# Patient Record
Sex: FEMALE | Race: WHITE | NOT HISPANIC OR LATINO | Employment: FULL TIME | URBAN - METROPOLITAN AREA
[De-identification: names, ages, dates, MRNs, and addresses within clinical notes are randomized per-mention and may not be internally consistent; named-entity substitution may affect disease eponyms.]

---

## 2023-07-07 ENCOUNTER — OFFICE VISIT (OUTPATIENT)
Dept: PRIMARY CARE | Facility: CLINIC | Age: 38
End: 2023-07-07
Payer: COMMERCIAL

## 2023-07-07 DIAGNOSIS — F98.8 ATTENTION DEFICIT DISORDER (ADD) IN ADULT: Primary | ICD-10-CM

## 2023-07-07 PROCEDURE — 99213 OFFICE O/P EST LOW 20 MIN: CPT | Performed by: NURSE PRACTITIONER

## 2023-07-07 RX ORDER — DEXTROAMPHETAMINE SACCHARATE, AMPHETAMINE ASPARTATE, DEXTROAMPHETAMINE SULFATE AND AMPHETAMINE SULFATE 1.25; 1.25; 1.25; 1.25 MG/1; MG/1; MG/1; MG/1
5 TABLET ORAL DAILY
Qty: 30 TABLET | Refills: 0 | Status: SHIPPED | OUTPATIENT
Start: 2023-07-07 | End: 2023-07-26 | Stop reason: DRUGHIGH

## 2023-07-07 NOTE — PROGRESS NOTES
Therapist @ [facility]  Ivonne Vivas  (sp?)   ASRS V suggestive of ADD  Discussed overlaps between anxiety and ADD  Reports work focus an issue.  Reviewed findings.    No H/A, palpitations, respiratory or GI sx.  Constitutional WNL.    Alert, good insight.  HRRR  CTA  Neuro without deficit  Thyroid unremarkable  No edema   What Type Of Note Output Would You Prefer (Optional)?: Bullet Format How Severe Are Your Spot(S)?: moderate Hpi Title: Evaluation of Skin Lesions Additional History: Waist up exam

## 2023-07-26 ENCOUNTER — OFFICE VISIT (OUTPATIENT)
Dept: PRIMARY CARE | Facility: CLINIC | Age: 38
End: 2023-07-26
Payer: COMMERCIAL

## 2023-07-26 DIAGNOSIS — F98.8 ATTENTION DEFICIT DISORDER (ADD) IN ADULT: Primary | ICD-10-CM

## 2023-07-26 PROCEDURE — 99213 OFFICE O/P EST LOW 20 MIN: CPT | Performed by: NURSE PRACTITIONER

## 2023-07-26 RX ORDER — DEXTROAMPHETAMINE SACCHARATE, AMPHETAMINE ASPARTATE, DEXTROAMPHETAMINE SULFATE AND AMPHETAMINE SULFATE 2.5; 2.5; 2.5; 2.5 MG/1; MG/1; MG/1; MG/1
10 TABLET ORAL 2 TIMES DAILY
Qty: 60 TABLET | Refills: 0 | Status: SHIPPED | OUTPATIENT
Start: 2023-07-26 | End: 2023-08-30 | Stop reason: SDUPTHER

## 2023-07-26 NOTE — PROGRESS NOTES
Subjective   Patient ID: Rylee Bear is a 38 y.o. female who presents for  follow up    No effect from Adderall 5 mgm  No untoward effects either.  Open to titration as discussed initially.  Urine tox screen ordered today but unable to send to lab d/t technical issue with [employer]        Review of Systems    Objective   Physical Exam  Vitals (114/68  70) reviewed.   Constitutional:       Appearance: Normal appearance.   Cardiovascular:      Rate and Rhythm: Normal rate and regular rhythm.   Pulmonary:      Effort: Pulmonary effort is normal.      Breath sounds: Normal breath sounds.   Skin:     Capillary Refill: Capillary refill takes less than 2 seconds.   Neurological:      General: No focal deficit present.      Mental Status: She is alert.   Psychiatric:         Mood and Affect: Mood normal.         Assessment/Plan

## 2023-07-31 NOTE — PATIENT INSTRUCTIONS
Reviewed PDMP  Will start low dose Adderall, assess for response(s) and revisit 2 weeks.  Reviewed SCA,  protocol.  Reviewed symptoms to report in the interim.

## 2023-08-14 ENCOUNTER — APPOINTMENT (OUTPATIENT)
Dept: PRIMARY CARE | Facility: CLINIC | Age: 38
End: 2023-08-14
Payer: COMMERCIAL

## 2023-08-16 NOTE — PATIENT INSTRUCTIONS
Will increase to 10 mgm, reviewed correct use.  CSA UTD per protocol, efficacy pending.  Follow up 1 month and PRN

## 2023-08-30 ENCOUNTER — OFFICE VISIT (OUTPATIENT)
Dept: PRIMARY CARE | Facility: CLINIC | Age: 38
End: 2023-08-30
Payer: COMMERCIAL

## 2023-08-30 DIAGNOSIS — Z00.00 ANNUAL PHYSICAL EXAM: ICD-10-CM

## 2023-08-30 DIAGNOSIS — F98.8 ATTENTION DEFICIT DISORDER (ADD) IN ADULT: Primary | ICD-10-CM

## 2023-08-30 PROCEDURE — 80324 DRUG SCREEN AMPHETAMINES 1/2: CPT

## 2023-08-30 PROCEDURE — 99396 PREV VISIT EST AGE 40-64: CPT | Performed by: NURSE PRACTITIONER

## 2023-08-30 PROCEDURE — 99213 OFFICE O/P EST LOW 20 MIN: CPT | Performed by: NURSE PRACTITIONER

## 2023-08-30 PROCEDURE — 80307 DRUG TEST PRSMV CHEM ANLYZR: CPT

## 2023-08-30 RX ORDER — DEXTROAMPHETAMINE SACCHARATE, AMPHETAMINE ASPARTATE, DEXTROAMPHETAMINE SULFATE AND AMPHETAMINE SULFATE 2.5; 2.5; 2.5; 2.5 MG/1; MG/1; MG/1; MG/1
10 TABLET ORAL DAILY
Qty: 30 TABLET | Refills: 0 | Status: SHIPPED | OUTPATIENT
Start: 2023-08-30 | End: 2023-10-11

## 2023-08-30 RX ORDER — DEXTROAMPHETAMINE SACCHARATE, AMPHETAMINE ASPARTATE, DEXTROAMPHETAMINE SULFATE AND AMPHETAMINE SULFATE 5; 5; 5; 5 MG/1; MG/1; MG/1; MG/1
20 TABLET ORAL DAILY
Qty: 30 TABLET | Refills: 0 | Status: SHIPPED | OUTPATIENT
Start: 2023-08-30 | End: 2023-10-11

## 2023-08-30 NOTE — PROGRESS NOTES
Subjective   Patient ID: Rylee Bear is a 38 y.o. female who presents for annual exam  Dentist  6 months  Eye  + Salzmans  Degeneration Nodules - january   Mammo gram    M - HTN cholesterol    Brothers not known  GP  fathers  COPD   ETOH  MGP  90s  Mac degen   CHOL CABG  F  85 s/p CABG and pacer   age 70s  Mom pre diabetic  weight up and down      Feels well overall  Knees chiro        Review of Systems   Constitutional: Negative.    HENT: Negative.     Respiratory: Negative.     Endocrine: Negative.    Genitourinary: Negative.        Objective   Physical Exam  Vitals reviewed.   Constitutional:       Appearance: Normal appearance.   Eyes:      Extraocular Movements: Extraocular movements intact.   Cardiovascular:      Rate and Rhythm: Normal rate and regular rhythm.   Pulmonary:      Effort: Pulmonary effort is normal.      Breath sounds: Normal breath sounds.   Abdominal:      Palpations: There is no mass.      Tenderness: There is no abdominal tenderness.   Musculoskeletal:      Cervical back: Normal range of motion.      Right lower leg: No edema.      Left lower leg: No edema.   Neurological:      General: No focal deficit present.      Mental Status: She is alert.   Psychiatric:         Mood and Affect: Mood normal.         Assessment/Plan   Diagnoses and all orders for this visit:  Attention deficit disorder (ADD) in adult  -     Drug Screen, Urine With Reflex to Confirmation  -     amphetamine-dextroamphetamine (Adderall) 10 mg tablet; Take 1 tablet (10 mg) by mouth once daily.  -     amphetamine-dextroamphetamine (Adderall) 20 mg tablet; Take 1 tablet (20 mg) by mouth once daily.  -     Amphetamine Confirm, Urine  Annual physical exam  -     Lipid Panel; Future  -     Comprehensive Metabolic Panel; Future  -     CBC and Auto Differential; Future

## 2023-08-31 LAB
AMPHETAMINE (PRESENCE) IN URINE BY SCREEN METHOD: ABNORMAL
BARBITURATES PRESENCE IN URINE BY SCREEN METHOD: ABNORMAL
BENZODIAZEPINE (PRESENCE) IN URINE BY SCREEN METHOD: ABNORMAL
CANNABINOIDS IN URINE BY SCREEN METHOD: ABNORMAL
COCAINE (PRESENCE) IN URINE BY SCREEN METHOD: ABNORMAL
DRUG SCREEN COMMENT URINE: ABNORMAL
FENTANYL URINE: ABNORMAL
OPIATES (PRESENCE) IN URINE BY SCREEN METHOD: ABNORMAL
OXYCODONE (PRESENCE) IN URINE BY SCREEN METHOD: ABNORMAL
PHENCYCLIDINE (PRESENCE) IN URINE BY SCREEN METHOD: ABNORMAL

## 2023-09-04 LAB
AMPHETAMINES,URINE: >5000 NG/ML
MDA,URINE: <200 NG/ML
MDEA,URINE: <200 NG/ML
MDMA,UR: <200 NG/ML
METHAMPHETAMINE QUANTITATIVE URINE: <200 NG/ML
PHENTERMINE,UR: <200 NG/ML

## 2023-09-15 ASSESSMENT — ENCOUNTER SYMPTOMS
ENDOCRINE NEGATIVE: 1
CONSTITUTIONAL NEGATIVE: 1
RESPIRATORY NEGATIVE: 1

## 2023-09-29 PROBLEM — J06.9 VIRAL UPPER RESPIRATORY TRACT INFECTION: Status: ACTIVE | Noted: 2023-09-29

## 2023-10-11 DIAGNOSIS — F98.8 ATTENTION DEFICIT DISORDER (ADD) IN ADULT: ICD-10-CM

## 2023-10-11 RX ORDER — DEXTROAMPHETAMINE SACCHARATE, AMPHETAMINE ASPARTATE, DEXTROAMPHETAMINE SULFATE AND AMPHETAMINE SULFATE 5; 5; 5; 5 MG/1; MG/1; MG/1; MG/1
40 TABLET ORAL DAILY
Qty: 90 TABLET | Refills: 0 | Status: SHIPPED | OUTPATIENT
Start: 2023-10-11 | End: 2023-11-27 | Stop reason: SDUPTHER

## 2023-10-20 ENCOUNTER — LAB (OUTPATIENT)
Dept: LAB | Facility: LAB | Age: 38
End: 2023-10-20
Payer: COMMERCIAL

## 2023-10-20 DIAGNOSIS — Z00.00 ANNUAL PHYSICAL EXAM: ICD-10-CM

## 2023-10-20 LAB
ALBUMIN SERPL BCP-MCNC: 3.9 G/DL (ref 3.4–5)
ALP SERPL-CCNC: 44 U/L (ref 33–110)
ALT SERPL W P-5'-P-CCNC: 16 U/L (ref 7–45)
ANION GAP SERPL CALC-SCNC: 11 MMOL/L (ref 10–20)
AST SERPL W P-5'-P-CCNC: 21 U/L (ref 9–39)
BASOPHILS # BLD AUTO: 0.1 X10*3/UL (ref 0–0.1)
BASOPHILS NFR BLD AUTO: 1.7 %
BILIRUB SERPL-MCNC: 0.6 MG/DL (ref 0–1.2)
BUN SERPL-MCNC: 19 MG/DL (ref 6–23)
CALCIUM SERPL-MCNC: 9.3 MG/DL (ref 8.6–10.3)
CHLORIDE SERPL-SCNC: 104 MMOL/L (ref 98–107)
CHOLEST SERPL-MCNC: 221 MG/DL (ref 0–199)
CHOLESTEROL/HDL RATIO: 2.5
CO2 SERPL-SCNC: 26 MMOL/L (ref 21–32)
CREAT SERPL-MCNC: 1.04 MG/DL (ref 0.5–1.05)
EOSINOPHIL # BLD AUTO: 0.41 X10*3/UL (ref 0–0.7)
EOSINOPHIL NFR BLD AUTO: 7 %
ERYTHROCYTE [DISTWIDTH] IN BLOOD BY AUTOMATED COUNT: 11.8 % (ref 11.5–14.5)
GFR SERPL CREATININE-BSD FRML MDRD: 70 ML/MIN/1.73M*2
GLUCOSE SERPL-MCNC: 90 MG/DL (ref 74–99)
HCT VFR BLD AUTO: 41.2 % (ref 36–46)
HDLC SERPL-MCNC: 88.1 MG/DL
HGB BLD-MCNC: 13.7 G/DL (ref 12–16)
IMM GRANULOCYTES # BLD AUTO: 0.01 X10*3/UL (ref 0–0.7)
IMM GRANULOCYTES NFR BLD AUTO: 0.2 % (ref 0–0.9)
LDLC SERPL CALC-MCNC: 116 MG/DL
LYMPHOCYTES # BLD AUTO: 2.41 X10*3/UL (ref 1.2–4.8)
LYMPHOCYTES NFR BLD AUTO: 41.1 %
MCH RBC QN AUTO: 29.8 PG (ref 26–34)
MCHC RBC AUTO-ENTMCNC: 33.3 G/DL (ref 32–36)
MCV RBC AUTO: 90 FL (ref 80–100)
MONOCYTES # BLD AUTO: 0.46 X10*3/UL (ref 0.1–1)
MONOCYTES NFR BLD AUTO: 7.8 %
NEUTROPHILS # BLD AUTO: 2.48 X10*3/UL (ref 1.2–7.7)
NEUTROPHILS NFR BLD AUTO: 42.2 %
NON HDL CHOLESTEROL: 133 MG/DL (ref 0–149)
NRBC BLD-RTO: 0 /100 WBCS (ref 0–0)
PLATELET # BLD AUTO: 321 X10*3/UL (ref 150–450)
PMV BLD AUTO: 10.2 FL (ref 7.5–11.5)
POTASSIUM SERPL-SCNC: 3.7 MMOL/L (ref 3.5–5.3)
PROT SERPL-MCNC: 7.3 G/DL (ref 6.4–8.2)
RBC # BLD AUTO: 4.6 X10*6/UL (ref 4–5.2)
SODIUM SERPL-SCNC: 137 MMOL/L (ref 136–145)
TRIGL SERPL-MCNC: 87 MG/DL (ref 0–149)
VLDL: 17 MG/DL (ref 0–40)
WBC # BLD AUTO: 5.9 X10*3/UL (ref 4.4–11.3)

## 2023-10-20 PROCEDURE — 80053 COMPREHEN METABOLIC PANEL: CPT

## 2023-10-20 PROCEDURE — 85025 COMPLETE CBC W/AUTO DIFF WBC: CPT

## 2023-10-20 PROCEDURE — 80061 LIPID PANEL: CPT

## 2023-10-20 PROCEDURE — 36415 COLL VENOUS BLD VENIPUNCTURE: CPT

## 2023-11-20 ENCOUNTER — PATIENT MESSAGE (OUTPATIENT)
Dept: PRIMARY CARE | Facility: CLINIC | Age: 38
End: 2023-11-20
Payer: COMMERCIAL

## 2023-11-20 DIAGNOSIS — F98.8 ATTENTION DEFICIT DISORDER (ADD) IN ADULT: ICD-10-CM

## 2023-11-27 RX ORDER — DEXTROAMPHETAMINE SACCHARATE, AMPHETAMINE ASPARTATE, DEXTROAMPHETAMINE SULFATE AND AMPHETAMINE SULFATE 5; 5; 5; 5 MG/1; MG/1; MG/1; MG/1
40 TABLET ORAL DAILY
Qty: 90 TABLET | Refills: 0 | Status: SHIPPED | OUTPATIENT
Start: 2023-11-27 | End: 2024-01-03 | Stop reason: SDUPTHER

## 2023-11-29 DIAGNOSIS — J30.9 ALLERGIC RHINITIS, UNSPECIFIED SEASONALITY, UNSPECIFIED TRIGGER: Primary | ICD-10-CM

## 2023-11-29 RX ORDER — LEVOCETIRIZINE DIHYDROCHLORIDE 5 MG/1
5 TABLET, FILM COATED ORAL DAILY
Qty: 30 TABLET | Refills: 0 | Status: SHIPPED | OUTPATIENT
Start: 2023-11-29 | End: 2023-12-28 | Stop reason: SDUPTHER

## 2023-12-28 DIAGNOSIS — J30.9 ALLERGIC RHINITIS, UNSPECIFIED SEASONALITY, UNSPECIFIED TRIGGER: ICD-10-CM

## 2023-12-28 RX ORDER — LEVOCETIRIZINE DIHYDROCHLORIDE 5 MG/1
5 TABLET, FILM COATED ORAL DAILY
Qty: 30 TABLET | Refills: 0 | Status: SHIPPED | OUTPATIENT
Start: 2023-12-28 | End: 2024-01-23 | Stop reason: SDUPTHER

## 2024-01-03 ENCOUNTER — TELEMEDICINE (OUTPATIENT)
Dept: PRIMARY CARE | Facility: CLINIC | Age: 39
End: 2024-01-03
Payer: COMMERCIAL

## 2024-01-03 DIAGNOSIS — F98.8 ATTENTION DEFICIT DISORDER (ADD) IN ADULT: ICD-10-CM

## 2024-01-03 PROCEDURE — 99213 OFFICE O/P EST LOW 20 MIN: CPT | Performed by: NURSE PRACTITIONER

## 2024-01-03 RX ORDER — DEXTROAMPHETAMINE SACCHARATE, AMPHETAMINE ASPARTATE, DEXTROAMPHETAMINE SULFATE AND AMPHETAMINE SULFATE 5; 5; 5; 5 MG/1; MG/1; MG/1; MG/1
40 TABLET ORAL DAILY
Qty: 90 TABLET | Refills: 0 | Status: SHIPPED | OUTPATIENT
Start: 2024-01-03 | End: 2024-02-02 | Stop reason: SDUPTHER

## 2024-01-15 DIAGNOSIS — Z30.019 ENCOUNTER FOR FEMALE BIRTH CONTROL: ICD-10-CM

## 2024-01-15 RX ORDER — DROSPIRENONE AND ETHINYL ESTRADIOL 0.03MG-3MG
1 KIT ORAL
Qty: 28 TABLET | Refills: 12 | Status: SHIPPED | OUTPATIENT
Start: 2024-01-15 | End: 2024-01-24 | Stop reason: SDUPTHER

## 2024-01-15 RX ORDER — DROSPIRENONE AND ETHINYL ESTRADIOL 0.03MG-3MG
1 KIT ORAL
COMMUNITY
Start: 2016-11-22 | End: 2024-01-15 | Stop reason: SDUPTHER

## 2024-01-19 ASSESSMENT — ENCOUNTER SYMPTOMS
NERVOUS/ANXIOUS: 0
GASTROINTESTINAL NEGATIVE: 1
SLEEP DISTURBANCE: 0
TREMORS: 0
LIGHT-HEADEDNESS: 0
ENDOCRINE NEGATIVE: 1
CONSTITUTIONAL NEGATIVE: 1
HYPERACTIVE: 0
HEADACHES: 0
DIZZINESS: 0
WEAKNESS: 0

## 2024-01-19 NOTE — PROGRESS NOTES
Subjective   Patient ID: Rylee Bear is a 38 y.o. female who presents for Med Refill.  Med followup, report no untoward effects of med use.  Wishes to continue with same dose    OARRS:  No data recorded  I believe that it is clinically appropriate for Rylee Bear to be prescribed this medication    Is the patient prescribed a combination of a benzodiazepine and opioid?  Yes, I feel it is clincially indicated to continue the medication and have discussed with the patient risks/benefits/alternatives.    Last Urine Drug Screen / ordered today: No  Recent Results (from the past 8760 hour(s))   Amphetamine Confirm, Urine    Collection Time: 08/30/23  2:29 PM   Result Value Ref Range    Methamphetamine Quant, Ur <200 ng/mL    MDA, Urine <200 ng/mL    MDEA, Urine <200 ng/mL    Phentermine,Urine <200 ng/mL    Amphetamines,Urine >5000 ng/mL    MDMA, Urine <200 ng/mL   Drug Screen, Urine With Reflex to Confirmation    Collection Time: 08/30/23  2:29 PM   Result Value Ref Range    DRUG SCREEN COMMENT URINE SEE BELOW     Amphetamine Screen, Urine PRESUMPTIVE POSITIVE (A) NEGATIVE    Barbiturate Screen, Urine PRESUMPTIVE NEGATIVE NEGATIVE    BENZODIAZEPINE (PRESENCE) IN URINE BY SCREEN METHOD PRESUMPTIVE NEGATIVE NEGATIVE    Cannabinoid Screen, Urine PRESUMPTIVE NEGATIVE NEGATIVE    Cocaine Screen, Urine PRESUMPTIVE NEGATIVE NEGATIVE    Fentanyl, Ur PRESUMPTIVE NEGATIVE NEGATIVE    Opiate Screen, Urine PRESUMPTIVE NEGATIVE NEGATIVE    Oxycodone Screen, Ur PRESUMPTIVE NEGATIVE NEGATIVE    PCP Screen, Urine PRESUMPTIVE NEGATIVE NEGATIVE     Results are as expected.         Controlled Substance Agreement:  Date of the Last Agreement: 08/23  Reviewed Controlled Substance Agreement including but not limited to the benefits, risks, and alternatives to treatment with a Controlled Substance medication(s).    Stimulants:   What is the patient's goal of therapy? Work focus  Is this being achieved with current treatment?  yes    Activities of Daily Living:   Is your overall impression that this patient is benefiting (symptom reduction outweighs side effects) from stimulant therapy? Yes     1. Physical Functioning: Same  2. Family Relationship: Same  3. Social Relationship: Same  4. Mood: Same  5. Sleep Patterns: Same  6. Overall Function: Better      Review of Systems   Constitutional: Negative.    Gastrointestinal: Negative.    Endocrine: Negative.    Neurological:  Negative for dizziness, tremors, weakness, light-headedness and headaches.   Psychiatric/Behavioral:  Negative for sleep disturbance. The patient is not nervous/anxious and is not hyperactive.        Objective   Physical Exam  Constitutional:       Appearance: Normal appearance.   Pulmonary:      Effort: Pulmonary effort is normal.   Musculoskeletal:      Cervical back: Neck supple.   Neurological:      General: No focal deficit present.      Mental Status: She is oriented to person, place, and time.         Assessment/Plan   Diagnoses and all orders for this visit:  Attention deficit disorder (ADD) in adult  -     amphetamine-dextroamphetamine (Adderall) 20 mg tablet; Take 2 tablets (40 mg) by mouth once daily. MR one tablet @ 1 PM if needed           TIRSO Rader-CNP 01/19/24 9:54 AM

## 2024-01-23 ENCOUNTER — OFFICE VISIT (OUTPATIENT)
Dept: ALLERGY | Facility: CLINIC | Age: 39
End: 2024-01-23
Payer: COMMERCIAL

## 2024-01-23 VITALS
HEART RATE: 64 BPM | HEIGHT: 69 IN | TEMPERATURE: 97.7 F | BODY MASS INDEX: 26.96 KG/M2 | RESPIRATION RATE: 17 BRPM | WEIGHT: 182 LBS | SYSTOLIC BLOOD PRESSURE: 120 MMHG | DIASTOLIC BLOOD PRESSURE: 74 MMHG | OXYGEN SATURATION: 97 %

## 2024-01-23 DIAGNOSIS — J30.9 ALLERGIC RHINITIS, UNSPECIFIED SEASONALITY, UNSPECIFIED TRIGGER: ICD-10-CM

## 2024-01-23 PROCEDURE — 99213 OFFICE O/P EST LOW 20 MIN: CPT | Performed by: ALLERGY & IMMUNOLOGY

## 2024-01-23 RX ORDER — LEVOCETIRIZINE DIHYDROCHLORIDE 5 MG/1
5 TABLET, FILM COATED ORAL DAILY
Qty: 90 TABLET | Refills: 3 | Status: SHIPPED | OUTPATIENT
Start: 2024-01-23 | End: 2025-01-22

## 2024-01-23 ASSESSMENT — ENCOUNTER SYMPTOMS
PSYCHIATRIC NEGATIVE: 1
GASTROINTESTINAL NEGATIVE: 1
RESPIRATORY NEGATIVE: 1
MUSCULOSKELETAL NEGATIVE: 1
EYES NEGATIVE: 1
NEUROLOGICAL NEGATIVE: 1
ENDOCRINE NEGATIVE: 1
CARDIOVASCULAR NEGATIVE: 1
CONSTITUTIONAL NEGATIVE: 1
HEMATOLOGIC/LYMPHATIC NEGATIVE: 1

## 2024-01-23 NOTE — PROGRESS NOTES
"Patient being seen for annual visit   Has no concerns  Last PAP: 6/18/18 NML HPV -                  5/9/14 NML   Last MAMM: 5/18/23 NML     Fabienne Jeronimo MA    Karlie Bear is a 38 y.o. female who is here for a routine exam. No vaginal concerns at this time including abnormal discharge, odor or discomfort. She is not currently sexually active and  has no concern for STI exposure. She has no pain or bleeding with sex. She denies bladder or bowel concerns.      Current contraception: OCP (estrogen/progesterone)  LMP: 1/17/24  Last pap: 2018  History of abnormal Pap smear: no  Due for pap: yes -    Family history of uterine or ovarian cancer: no  Family history of prostate cancer: no  Family history of pancreatic cancer: no  Family hx of BRCA1/BRCA2: no  Family history of breast cancer: no  Last mammogram: 5/2023, nml  Gardasil:       OB History    No obstetric history on file.         Review of Systems   All other systems reviewed and are negative.      Objective   /84 (BP Location: Right arm, Patient Position: Sitting, BP Cuff Size: Adult)   Ht 1.753 m (5' 9\")   Wt 82.6 kg (182 lb)   LMP 01/16/2024   BMI 26.88 kg/m²     Physical Exam  HENT:      Mouth/Throat:      Mouth: Mucous membranes are moist.   Eyes:      Conjunctiva/sclera: Conjunctivae normal.   Neck:      Thyroid: No thyroid mass, thyromegaly or thyroid tenderness.   Cardiovascular:      Rate and Rhythm: Normal rate and regular rhythm.      Pulses: Normal pulses.   Pulmonary:      Effort: Pulmonary effort is normal.      Breath sounds: Normal breath sounds.   Chest:   Breasts:     Breasts are symmetrical.      Right: Normal.      Left: Normal.   Abdominal:      General: Abdomen is flat.      Palpations: Abdomen is soft.      Hernia: There is no hernia in the left inguinal area or right inguinal area.   Genitourinary:     General: Normal vulva.      Vagina: Normal.      Cervix: Normal.      Uterus: Normal.       Adnexa: Right " adnexa normal and left adnexa normal.      Comments: Pap collected today  Musculoskeletal:         General: Normal range of motion.      Cervical back: Normal range of motion.   Lymphadenopathy:      Cervical: No cervical adenopathy.      Right cervical: No superficial, deep or posterior cervical adenopathy.     Left cervical: No superficial, deep or posterior cervical adenopathy.      Lower Body: No right inguinal adenopathy. No left inguinal adenopathy.   Skin:     General: Skin is warm.      Capillary Refill: Capillary refill takes less than 2 seconds.   Neurological:      Mental Status: She is alert and oriented to person, place, and time.   Psychiatric:         Mood and Affect: Mood normal.         Behavior: Behavior normal.          Assessment/Plan   A&P --> 38 y.o. y.o woman for annual GYN exam.     - Health Maintenance -->  - Routine follow up with PCP for health maintenance examination encouraged, including TSH, cholesterol, and Vit. D evaluation.  Self breast awareness encouraged; concerning characteristics of breasts reviewed - Pt. will report general concerns, any adherent lumps, skin dimpling/puckering or color changes, and any nipple discharge.    Diet and exercise reviewed.     Pap: will pap today and if wnl next pap in 5 years:- Reviewed ACOG and ASCCP guidelines with pt.        - STD Screening: declines     - Contraception Plan: OCP (estrogen/progesterone). No contraindications to continued use and refills sent to pharmacy     - F/U 1 year or as needed.      KIRSTIE Gaxiola

## 2024-01-23 NOTE — PROGRESS NOTES
"Subjective   Patient ID: Rylee Bear is a 38 y.o. female.    Chief Complaint: follow up  37 yo with a history of cat allergy and food intolerance to dairy.  Has two older cats (16 and 18 yo)  Notices congestion with dairy and beer.  No other new issues.        Environmental History: Pets in the home: cats (2).    Review of Systems   Constitutional: Negative.    HENT: Negative.     Eyes: Negative.    Respiratory: Negative.     Cardiovascular: Negative.    Gastrointestinal: Negative.    Endocrine: Negative.    Genitourinary: Negative.    Musculoskeletal: Negative.    Neurological: Negative.    Hematological: Negative.    Psychiatric/Behavioral: Negative.     /74   Pulse 64   Temp 36.5 °C (97.7 °F) (Oral)   Resp 17   Ht 1.753 m (5' 9\")   Wt 82.6 kg (182 lb)   SpO2 97%   BMI 26.88 kg/m²     Objective   Physical Exam  Vitals and nursing note reviewed.   Constitutional:       Appearance: Normal appearance.   HENT:      Right Ear: Tympanic membrane normal.      Left Ear: Tympanic membrane normal.      Nose: Nose normal.      Mouth/Throat:      Mouth: Mucous membranes are moist.   Eyes:      Conjunctiva/sclera: Conjunctivae normal.      Pupils: Pupils are equal, round, and reactive to light.   Cardiovascular:      Rate and Rhythm: Normal rate and regular rhythm.      Heart sounds: Normal heart sounds.   Pulmonary:      Effort: Pulmonary effort is normal.      Breath sounds: Normal breath sounds.   Abdominal:      General: Bowel sounds are normal.      Palpations: Abdomen is soft.   Musculoskeletal:         General: Normal range of motion.   Skin:     General: Skin is warm and dry.      Capillary Refill: Capillary refill takes less than 2 seconds.   Neurological:      General: No focal deficit present.      Mental Status: She is alert and oriented to person, place, and time.   Psychiatric:         Mood and Affect: Mood normal.     Laboratory:   N/a    Assessment/Plan    Well controlled allergic rhinitis on " Xyzal only. Has two elderly cats.    Continue current medication and avoidance measures  Follow up yearly or as needed.

## 2024-01-24 ENCOUNTER — OFFICE VISIT (OUTPATIENT)
Dept: OBSTETRICS AND GYNECOLOGY | Facility: CLINIC | Age: 39
End: 2024-01-24
Payer: COMMERCIAL

## 2024-01-24 VITALS
BODY MASS INDEX: 26.96 KG/M2 | DIASTOLIC BLOOD PRESSURE: 84 MMHG | HEIGHT: 69 IN | SYSTOLIC BLOOD PRESSURE: 128 MMHG | WEIGHT: 182 LBS

## 2024-01-24 DIAGNOSIS — Z30.019 ENCOUNTER FOR FEMALE BIRTH CONTROL: ICD-10-CM

## 2024-01-24 DIAGNOSIS — Z12.31 SCREENING MAMMOGRAM FOR BREAST CANCER: ICD-10-CM

## 2024-01-24 DIAGNOSIS — Z01.419 WELL WOMAN EXAM WITH ROUTINE GYNECOLOGICAL EXAM: Primary | ICD-10-CM

## 2024-01-24 PROCEDURE — 88175 CYTOPATH C/V AUTO FLUID REDO: CPT

## 2024-01-24 PROCEDURE — 87624 HPV HI-RISK TYP POOLED RSLT: CPT

## 2024-01-24 PROCEDURE — 1036F TOBACCO NON-USER: CPT

## 2024-01-24 PROCEDURE — 99396 PREV VISIT EST AGE 40-64: CPT

## 2024-01-24 RX ORDER — DROSPIRENONE AND ETHINYL ESTRADIOL 0.03MG-3MG
1 KIT ORAL
Qty: 84 TABLET | Refills: 3 | Status: SHIPPED | OUTPATIENT
Start: 2024-01-24 | End: 2025-01-23

## 2024-02-02 ENCOUNTER — TELEPHONE (OUTPATIENT)
Dept: PRIMARY CARE | Facility: CLINIC | Age: 39
End: 2024-02-02
Payer: COMMERCIAL

## 2024-02-02 DIAGNOSIS — F98.8 ATTENTION DEFICIT DISORDER (ADD) IN ADULT: ICD-10-CM

## 2024-02-05 LAB
CYTOLOGY CMNT CVX/VAG CYTO-IMP: NORMAL
HPV HR 12 DNA GENITAL QL NAA+PROBE: NEGATIVE
HPV HR GENOTYPES PNL CVX NAA+PROBE: NEGATIVE
HPV16 DNA SPEC QL NAA+PROBE: NEGATIVE
HPV18 DNA SPEC QL NAA+PROBE: NEGATIVE
LAB AP HPV GENOTYPE QUESTION: YES
LAB AP HPV HR: NORMAL
LABORATORY COMMENT REPORT: NORMAL
PATH REPORT.TOTAL CANCER: NORMAL

## 2024-02-05 RX ORDER — DEXTROAMPHETAMINE SACCHARATE, AMPHETAMINE ASPARTATE, DEXTROAMPHETAMINE SULFATE AND AMPHETAMINE SULFATE 5; 5; 5; 5 MG/1; MG/1; MG/1; MG/1
40 TABLET ORAL DAILY
Qty: 90 TABLET | Refills: 0 | Status: SHIPPED | OUTPATIENT
Start: 2024-02-05 | End: 2024-05-03 | Stop reason: SDUPTHER

## 2024-04-29 ENCOUNTER — TELEPHONE (OUTPATIENT)
Dept: PRIMARY CARE | Facility: CLINIC | Age: 39
End: 2024-04-29
Payer: COMMERCIAL

## 2024-04-29 DIAGNOSIS — F98.8 ATTENTION DEFICIT DISORDER (ADD) IN ADULT: ICD-10-CM

## 2024-04-29 NOTE — TELEPHONE ENCOUNTER
Rx Refill Request Telephone Encounter    Name:  Rylee Bear  :  [id number]  Medication Name:    amphetamine-dextroamphetamine (Adderall) 20 mg tablet                Specific Pharmacy location:    [place] [address] [place] [facility]  [facility]   [place] [id number]  Phone: [phone]  Fax: [phone]     Date of last appointment:  2024  Date of next appointment:  2024  Best number to reach patient:  [phone]

## 2024-05-01 RX ORDER — DEXTROAMPHETAMINE SACCHARATE, AMPHETAMINE ASPARTATE, DEXTROAMPHETAMINE SULFATE AND AMPHETAMINE SULFATE 5; 5; 5; 5 MG/1; MG/1; MG/1; MG/1
40 TABLET ORAL DAILY
Qty: 90 TABLET | Refills: 0 | OUTPATIENT
Start: 2024-05-01 | End: 2024-05-31

## 2024-05-03 ENCOUNTER — OFFICE VISIT (OUTPATIENT)
Dept: PRIMARY CARE | Facility: CLINIC | Age: 39
End: 2024-05-03
Payer: COMMERCIAL

## 2024-05-03 VITALS
BODY MASS INDEX: 27.55 KG/M2 | RESPIRATION RATE: 16 BRPM | TEMPERATURE: 97.6 F | WEIGHT: 186 LBS | DIASTOLIC BLOOD PRESSURE: 90 MMHG | HEIGHT: 69 IN | OXYGEN SATURATION: 99 % | HEART RATE: 69 BPM | SYSTOLIC BLOOD PRESSURE: 138 MMHG

## 2024-05-03 DIAGNOSIS — F98.8 ATTENTION DEFICIT DISORDER (ADD) IN ADULT: ICD-10-CM

## 2024-05-03 PROCEDURE — 99213 OFFICE O/P EST LOW 20 MIN: CPT | Performed by: NURSE PRACTITIONER

## 2024-05-03 RX ORDER — DEXTROAMPHETAMINE SACCHARATE, AMPHETAMINE ASPARTATE, DEXTROAMPHETAMINE SULFATE AND AMPHETAMINE SULFATE 5; 5; 5; 5 MG/1; MG/1; MG/1; MG/1
40 TABLET ORAL DAILY
Qty: 90 TABLET | Refills: 0 | Status: SHIPPED | OUTPATIENT
Start: 2024-05-03 | End: 2024-06-02

## 2024-05-03 NOTE — PROGRESS NOTES
"Karlie Bear is a 39 y.o. female who presents for Follow-up and ADHD.  Patient is here to Follow up on ADD.  Doing well on current regimen  reports no untoward effects.  CSA and urine drug screen up to date 8/3/23  No new concerns    ADHD  This is a chronic problem.     Review of Systems   Constitutional: Negative.  Negative for unexpected weight change.   Cardiovascular:  Negative for palpitations and leg swelling.   Gastrointestinal: Negative.    Neurological: Negative.    All other systems reviewed and are negative.      Objective   Physical Exam  Vitals and nursing note reviewed.   Constitutional:       Appearance: Normal appearance.   Cardiovascular:      Rate and Rhythm: Normal rate and regular rhythm.      Pulses: Normal pulses.   Pulmonary:      Effort: Pulmonary effort is normal.      Breath sounds: Normal breath sounds.   Musculoskeletal:      Cervical back: Normal range of motion.   Neurological:      General: No focal deficit present.      Mental Status: She is oriented to person, place, and time.   Psychiatric:         Mood and Affect: Mood normal.       /90 (BP Location: Right arm, Patient Position: Sitting)   Pulse 69   Temp 36.4 °C (97.6 °F)   Resp 16   Ht 1.753 m (5' 9\")   Wt 84.4 kg (186 lb)   SpO2 99%   BMI 27.47 kg/m²       Assessment/Plan   Problem List Items Addressed This Visit       Attention deficit disorder (ADD) in adult    Relevant Medications    amphetamine-dextroamphetamine (Adderall) 20 mg tablet       "

## 2024-05-31 ASSESSMENT — ENCOUNTER SYMPTOMS
NEUROLOGICAL NEGATIVE: 1
PALPITATIONS: 0
GASTROINTESTINAL NEGATIVE: 1
CONSTITUTIONAL NEGATIVE: 1
UNEXPECTED WEIGHT CHANGE: 0

## 2024-06-17 DIAGNOSIS — F98.8 ATTENTION DEFICIT DISORDER (ADD) IN ADULT: ICD-10-CM

## 2024-06-17 RX ORDER — DEXTROAMPHETAMINE SACCHARATE, AMPHETAMINE ASPARTATE, DEXTROAMPHETAMINE SULFATE AND AMPHETAMINE SULFATE 5; 5; 5; 5 MG/1; MG/1; MG/1; MG/1
40 TABLET ORAL DAILY
Qty: 90 TABLET | Refills: 0 | Status: SHIPPED | OUTPATIENT
Start: 2024-06-17 | End: 2024-07-17
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
## 2024-06-21 ENCOUNTER — OFFICE VISIT (OUTPATIENT)
Dept: ORTHOPEDIC SURGERY | Facility: CLINIC | Age: 39
End: 2024-06-21
Payer: COMMERCIAL

## 2024-06-21 VITALS — HEIGHT: 69 IN | WEIGHT: 186 LBS | BODY MASS INDEX: 27.55 KG/M2

## 2024-06-21 DIAGNOSIS — S83.511A RUPTURE OF ANTERIOR CRUCIATE LIGAMENT OF RIGHT KNEE, INITIAL ENCOUNTER: Primary | ICD-10-CM

## 2024-06-21 DIAGNOSIS — M25.561 RIGHT KNEE PAIN, UNSPECIFIED CHRONICITY: ICD-10-CM

## 2024-06-21 PROCEDURE — 99213 OFFICE O/P EST LOW 20 MIN: CPT | Performed by: ORTHOPAEDIC SURGERY

## 2024-06-21 PROCEDURE — 99204 OFFICE O/P NEW MOD 45 MIN: CPT | Performed by: ORTHOPAEDIC SURGERY

## 2024-06-21 RX ORDER — IBUPROFEN 800 MG/1
800 TABLET ORAL EVERY 8 HOURS PRN
Qty: 90 TABLET | Refills: 0 | Status: CANCELLED | OUTPATIENT
Start: 2024-06-21 | End: 2024-07-21

## 2024-06-21 RX ORDER — METHYLPREDNISOLONE 4 MG/1
TABLET ORAL
Qty: 21 TABLET | Refills: 0 | Status: CANCELLED | OUTPATIENT
Start: 2024-06-21

## 2024-06-21 RX ORDER — IBUPROFEN 800 MG/1
200 TABLET ORAL EVERY 8 HOURS PRN
COMMUNITY

## 2024-06-24 PROBLEM — S83.511A SPRAIN OF ANTERIOR CRUCIATE LIGAMENT OF RIGHT KNEE: Status: ACTIVE | Noted: 2024-06-24

## 2024-07-17 ENCOUNTER — TELEPHONE (OUTPATIENT)
Dept: ORTHOPEDIC SURGERY | Facility: CLINIC | Age: 39
End: 2024-07-17
Payer: COMMERCIAL

## 2024-07-17 NOTE — TELEPHONE ENCOUNTER
Left a voicemail with Bracing Department Phone # if patient does not have a pair of crutches at home

## 2024-07-18 ENCOUNTER — TELEPHONE (OUTPATIENT)
Dept: ORTHOPEDIC SURGERY | Facility: CLINIC | Age: 39
End: 2024-07-18
Payer: COMMERCIAL

## 2024-07-18 NOTE — TELEPHONE ENCOUNTER
Left a voicemail with the DME Departments Phone Number. Gave instructions on both if/if patient does not have crutches

## 2024-07-19 ENCOUNTER — TELEPHONE (OUTPATIENT)
Dept: ORTHOPEDIC SURGERY | Facility: CLINIC | Age: 39
End: 2024-07-19
Payer: COMMERCIAL

## 2024-07-19 NOTE — TELEPHONE ENCOUNTER
Left Information about needing crutches for surgery. If patient does not have crutches they can call DME Department to set up an appointment.

## 2024-07-29 ENCOUNTER — HOSPITAL ENCOUNTER (OUTPATIENT)
Dept: RADIOLOGY | Facility: CLINIC | Age: 39
Discharge: HOME | End: 2024-07-29
Payer: COMMERCIAL

## 2024-07-29 ENCOUNTER — OFFICE VISIT (OUTPATIENT)
Dept: PRIMARY CARE | Facility: CLINIC | Age: 39
End: 2024-07-29
Payer: COMMERCIAL

## 2024-07-29 VITALS
RESPIRATION RATE: 16 BRPM | TEMPERATURE: 97.8 F | DIASTOLIC BLOOD PRESSURE: 76 MMHG | HEART RATE: 70 BPM | BODY MASS INDEX: 26.66 KG/M2 | HEIGHT: 69 IN | WEIGHT: 180 LBS | OXYGEN SATURATION: 98 % | SYSTOLIC BLOOD PRESSURE: 130 MMHG

## 2024-07-29 VITALS — BODY MASS INDEX: 26.66 KG/M2 | HEIGHT: 69 IN | WEIGHT: 180 LBS

## 2024-07-29 DIAGNOSIS — F98.8 ATTENTION DEFICIT DISORDER (ADD) IN ADULT: Primary | ICD-10-CM

## 2024-07-29 DIAGNOSIS — T63.441A BEE STING, ACCIDENTAL OR UNINTENTIONAL, INITIAL ENCOUNTER: ICD-10-CM

## 2024-07-29 DIAGNOSIS — Z23 IMMUNIZATION DUE: ICD-10-CM

## 2024-07-29 DIAGNOSIS — Z12.31 SCREENING MAMMOGRAM FOR BREAST CANCER: ICD-10-CM

## 2024-07-29 PROBLEM — B02.9 HERPES ZOSTER: Status: ACTIVE | Noted: 2024-07-29

## 2024-07-29 PROBLEM — R00.2 PALPITATIONS: Status: ACTIVE | Noted: 2024-07-29

## 2024-07-29 PROBLEM — R79.89 ABNORMAL TSH: Status: ACTIVE | Noted: 2024-07-29

## 2024-07-29 PROBLEM — S83.249A TEAR OF MEDIAL MENISCUS OF KNEE: Status: ACTIVE | Noted: 2024-06-11

## 2024-07-29 PROBLEM — M23.90 DERANGEMENT OF KNEE: Status: ACTIVE | Noted: 2024-06-11

## 2024-07-29 PROBLEM — N84.1 CERVICAL POLYP: Status: ACTIVE | Noted: 2024-07-29

## 2024-07-29 PROCEDURE — 90471 IMMUNIZATION ADMIN: CPT | Performed by: NURSE PRACTITIONER

## 2024-07-29 PROCEDURE — 3008F BODY MASS INDEX DOCD: CPT | Performed by: NURSE PRACTITIONER

## 2024-07-29 PROCEDURE — 99214 OFFICE O/P EST MOD 30 MIN: CPT | Performed by: NURSE PRACTITIONER

## 2024-07-29 PROCEDURE — 77067 SCR MAMMO BI INCL CAD: CPT

## 2024-07-29 PROCEDURE — 90715 TDAP VACCINE 7 YRS/> IM: CPT | Performed by: NURSE PRACTITIONER

## 2024-07-29 PROCEDURE — 1036F TOBACCO NON-USER: CPT | Performed by: NURSE PRACTITIONER

## 2024-07-29 PROCEDURE — 77063 BREAST TOMOSYNTHESIS BI: CPT | Performed by: RADIOLOGY

## 2024-07-29 PROCEDURE — 77067 SCR MAMMO BI INCL CAD: CPT | Performed by: RADIOLOGY

## 2024-07-29 RX ORDER — SULFAMETHOXAZOLE AND TRIMETHOPRIM 800; 160 MG/1; MG/1
1 TABLET ORAL 2 TIMES DAILY
Qty: 10 TABLET | Refills: 0 | Status: SHIPPED | OUTPATIENT
Start: 2024-07-29 | End: 2024-08-03

## 2024-07-29 RX ORDER — DEXTROAMPHETAMINE SACCHARATE, AMPHETAMINE ASPARTATE, DEXTROAMPHETAMINE SULFATE AND AMPHETAMINE SULFATE 5; 5; 5; 5 MG/1; MG/1; MG/1; MG/1
40 TABLET ORAL DAILY
Qty: 90 TABLET | Refills: 0 | Status: SHIPPED | OUTPATIENT
Start: 2024-07-29 | End: 2024-08-28

## 2024-07-29 NOTE — PROGRESS NOTES
"Subjective   Patient ID: Rylee Bear is a 39 y.o. female who presents for Insect Bite.    Patient got stung by a bee on the bottom of her right foot   Unsure if stinger is still in   Has some discomfort , swelling, and warm to touch   Patient has tried icing and soaking it to see if the stinger would come out   Refills ADD continues to do well on current regimen without untoward effects.  CSA, urine UTD, OARRS UTD  Continues to be effective for work focus.  Protocol UTD          Review of Systems   All other systems reviewed and are negative.      Objective   /76   Pulse 70   Temp 36.6 °C (97.8 °F)   Resp 16   Ht 1.753 m (5' 9\")   Wt 81.6 kg (180 lb)   LMP  (LMP Unknown)   SpO2 98%   BMI 26.58 kg/m²     Physical Exam  Vitals reviewed.   Constitutional:       Appearance: Normal appearance. She is not ill-appearing.   Cardiovascular:      Rate and Rhythm: Normal rate and regular rhythm.   Pulmonary:      Effort: Pulmonary effort is normal.      Breath sounds: Normal breath sounds.   Skin:     Comments: Stinger removed bottom of foot, some surrounding erythema  Occurred pulic park, will cover bacterial r/t vector, Tdap   Neurological:      General: No focal deficit present.      Mental Status: She is oriented to person, place, and time.   Psychiatric:         Mood and Affect: Mood normal.         Behavior: Behavior normal.         Assessment/Plan          "

## 2024-07-31 ENCOUNTER — LAB (OUTPATIENT)
Dept: LAB | Facility: LAB | Age: 39
End: 2024-07-31
Payer: COMMERCIAL

## 2024-07-31 DIAGNOSIS — S83.511A SPRAIN OF ANTERIOR CRUCIATE LIGAMENT OF RIGHT KNEE, INITIAL ENCOUNTER: ICD-10-CM

## 2024-07-31 LAB
ALBUMIN SERPL BCP-MCNC: 4.1 G/DL (ref 3.4–5)
ALP SERPL-CCNC: 45 U/L (ref 33–110)
ALT SERPL W P-5'-P-CCNC: 12 U/L (ref 7–45)
ANION GAP SERPL CALC-SCNC: 14 MMOL/L (ref 10–20)
APTT PPP: 27 SECONDS (ref 27–38)
AST SERPL W P-5'-P-CCNC: 16 U/L (ref 9–39)
BASOPHILS # BLD AUTO: 0.08 X10*3/UL (ref 0–0.1)
BASOPHILS NFR BLD AUTO: 1.2 %
BILIRUB SERPL-MCNC: 0.5 MG/DL (ref 0–1.2)
BUN SERPL-MCNC: 17 MG/DL (ref 6–23)
CALCIUM SERPL-MCNC: 9.4 MG/DL (ref 8.6–10.6)
CHLORIDE SERPL-SCNC: 102 MMOL/L (ref 98–107)
CO2 SERPL-SCNC: 25 MMOL/L (ref 21–32)
CREAT SERPL-MCNC: 1.19 MG/DL (ref 0.5–1.05)
EGFRCR SERPLBLD CKD-EPI 2021: 59 ML/MIN/1.73M*2
EOSINOPHIL # BLD AUTO: 0.49 X10*3/UL (ref 0–0.7)
EOSINOPHIL NFR BLD AUTO: 7.6 %
ERYTHROCYTE [DISTWIDTH] IN BLOOD BY AUTOMATED COUNT: 12.3 % (ref 11.5–14.5)
GLUCOSE SERPL-MCNC: 100 MG/DL (ref 74–99)
HCT VFR BLD AUTO: 42.5 % (ref 36–46)
HGB BLD-MCNC: 13.7 G/DL (ref 12–16)
IMM GRANULOCYTES # BLD AUTO: 0.01 X10*3/UL (ref 0–0.7)
IMM GRANULOCYTES NFR BLD AUTO: 0.2 % (ref 0–0.9)
INR PPP: 0.8 (ref 0.9–1.1)
LYMPHOCYTES # BLD AUTO: 2.13 X10*3/UL (ref 1.2–4.8)
LYMPHOCYTES NFR BLD AUTO: 33 %
MCH RBC QN AUTO: 29.5 PG (ref 26–34)
MCHC RBC AUTO-ENTMCNC: 32.2 G/DL (ref 32–36)
MCV RBC AUTO: 91 FL (ref 80–100)
MONOCYTES # BLD AUTO: 0.54 X10*3/UL (ref 0.1–1)
MONOCYTES NFR BLD AUTO: 8.4 %
NEUTROPHILS # BLD AUTO: 3.2 X10*3/UL (ref 1.2–7.7)
NEUTROPHILS NFR BLD AUTO: 49.6 %
NRBC BLD-RTO: 0 /100 WBCS (ref 0–0)
PLATELET # BLD AUTO: 305 X10*3/UL (ref 150–450)
POTASSIUM SERPL-SCNC: 4.4 MMOL/L (ref 3.5–5.3)
PROT SERPL-MCNC: 7 G/DL (ref 6.4–8.2)
PROTHROMBIN TIME: 9.5 SECONDS (ref 9.8–12.8)
RBC # BLD AUTO: 4.65 X10*6/UL (ref 4–5.2)
SODIUM SERPL-SCNC: 137 MMOL/L (ref 136–145)
WBC # BLD AUTO: 6.5 X10*3/UL (ref 4.4–11.3)

## 2024-07-31 PROCEDURE — 85610 PROTHROMBIN TIME: CPT

## 2024-07-31 PROCEDURE — 80053 COMPREHEN METABOLIC PANEL: CPT

## 2024-07-31 PROCEDURE — 85730 THROMBOPLASTIN TIME PARTIAL: CPT

## 2024-07-31 PROCEDURE — 36415 COLL VENOUS BLD VENIPUNCTURE: CPT

## 2024-07-31 PROCEDURE — 85025 COMPLETE CBC W/AUTO DIFF WBC: CPT

## 2024-08-01 ENCOUNTER — TELEPHONE (OUTPATIENT)
Dept: ORTHOPEDIC SURGERY | Facility: CLINIC | Age: 39
End: 2024-08-01
Payer: COMMERCIAL

## 2024-08-01 NOTE — TELEPHONE ENCOUNTER
Left a voicemail stating insurance will not cover [employer]. Price is $169.00. Left bracing dept. phone number so patient can call back if interested in receiving unit

## 2024-08-02 ENCOUNTER — TELEPHONE (OUTPATIENT)
Dept: ORTHOPEDIC SURGERY | Facility: CLINIC | Age: 39
End: 2024-08-02
Payer: COMMERCIAL

## 2024-08-05 ENCOUNTER — APPOINTMENT (OUTPATIENT)
Dept: ORTHOPEDIC SURGERY | Facility: CLINIC | Age: 39
End: 2024-08-05
Payer: COMMERCIAL

## 2024-08-06 DIAGNOSIS — S83.511A RUPTURE OF ANTERIOR CRUCIATE LIGAMENT OF RIGHT KNEE, INITIAL ENCOUNTER: Primary | ICD-10-CM

## 2024-08-06 RX ORDER — OXYCODONE AND ACETAMINOPHEN 5; 325 MG/1; MG/1
1 TABLET ORAL EVERY 6 HOURS PRN
Qty: 28 TABLET | Refills: 0 | Status: SHIPPED | OUTPATIENT
Start: 2024-08-07 | End: 2024-08-14

## 2024-08-07 ENCOUNTER — ANESTHESIA EVENT (OUTPATIENT)
Dept: OPERATING ROOM | Facility: HOSPITAL | Age: 39
End: 2024-08-07
Payer: COMMERCIAL

## 2024-08-07 ENCOUNTER — ANESTHESIA (OUTPATIENT)
Dept: OPERATING ROOM | Facility: HOSPITAL | Age: 39
End: 2024-08-07
Payer: COMMERCIAL

## 2024-08-07 ENCOUNTER — HOSPITAL ENCOUNTER (OUTPATIENT)
Facility: HOSPITAL | Age: 39
Setting detail: OUTPATIENT SURGERY
Discharge: HOME | End: 2024-08-07
Attending: ORTHOPAEDIC SURGERY | Admitting: ORTHOPAEDIC SURGERY
Payer: COMMERCIAL

## 2024-08-07 VITALS
HEART RATE: 73 BPM | RESPIRATION RATE: 18 BRPM | DIASTOLIC BLOOD PRESSURE: 76 MMHG | OXYGEN SATURATION: 98 % | HEIGHT: 69 IN | BODY MASS INDEX: 26.64 KG/M2 | SYSTOLIC BLOOD PRESSURE: 135 MMHG | WEIGHT: 179.9 LBS | TEMPERATURE: 97 F

## 2024-08-07 DIAGNOSIS — S83.511A SPRAIN OF ANTERIOR CRUCIATE LIGAMENT OF RIGHT KNEE, INITIAL ENCOUNTER: Primary | ICD-10-CM

## 2024-08-07 LAB — PREGNANCY TEST URINE, POC: NEGATIVE

## 2024-08-07 PROCEDURE — 2500000004 HC RX 250 GENERAL PHARMACY W/ HCPCS (ALT 636 FOR OP/ED): Performed by: STUDENT IN AN ORGANIZED HEALTH CARE EDUCATION/TRAINING PROGRAM

## 2024-08-07 PROCEDURE — 2500000004 HC RX 250 GENERAL PHARMACY W/ HCPCS (ALT 636 FOR OP/ED): Performed by: PHYSICIAN ASSISTANT

## 2024-08-07 PROCEDURE — 7100000001 HC RECOVERY ROOM TIME - INITIAL BASE CHARGE: Performed by: ORTHOPAEDIC SURGERY

## 2024-08-07 PROCEDURE — 7100000010 HC PHASE TWO TIME - EACH INCREMENTAL 1 MINUTE: Performed by: ORTHOPAEDIC SURGERY

## 2024-08-07 PROCEDURE — 2780000003 HC OR 278 NO HCPCS: Performed by: ORTHOPAEDIC SURGERY

## 2024-08-07 PROCEDURE — 7100000002 HC RECOVERY ROOM TIME - EACH INCREMENTAL 1 MINUTE: Performed by: ORTHOPAEDIC SURGERY

## 2024-08-07 PROCEDURE — 2720000007 HC OR 272 NO HCPCS: Performed by: ORTHOPAEDIC SURGERY

## 2024-08-07 PROCEDURE — 29882 ARTHRS KNE SRG MNISC RPR M/L: CPT | Performed by: ORTHOPAEDIC SURGERY

## 2024-08-07 PROCEDURE — 2500000005 HC RX 250 GENERAL PHARMACY W/O HCPCS: Performed by: ANESTHESIOLOGY

## 2024-08-07 PROCEDURE — 2500000004 HC RX 250 GENERAL PHARMACY W/ HCPCS (ALT 636 FOR OP/ED): Mod: JZ | Performed by: PHYSICIAN ASSISTANT

## 2024-08-07 PROCEDURE — C1762 CONN TISS, HUMAN(INC FASCIA): HCPCS | Performed by: ORTHOPAEDIC SURGERY

## 2024-08-07 PROCEDURE — 2500000004 HC RX 250 GENERAL PHARMACY W/ HCPCS (ALT 636 FOR OP/ED): Performed by: ANESTHESIOLOGY

## 2024-08-07 PROCEDURE — 2500000005 HC RX 250 GENERAL PHARMACY W/O HCPCS: Performed by: ORTHOPAEDIC SURGERY

## 2024-08-07 PROCEDURE — 7100000009 HC PHASE TWO TIME - INITIAL BASE CHARGE: Performed by: ORTHOPAEDIC SURGERY

## 2024-08-07 PROCEDURE — 3600000009 HC OR TIME - EACH INCREMENTAL 1 MINUTE - PROCEDURE LEVEL FOUR: Performed by: ORTHOPAEDIC SURGERY

## 2024-08-07 PROCEDURE — 29888 ARTHRS AID ACL RPR/AGMNTJ: CPT | Performed by: ORTHOPAEDIC SURGERY

## 2024-08-07 PROCEDURE — 2500000001 HC RX 250 WO HCPCS SELF ADMINISTERED DRUGS (ALT 637 FOR MEDICARE OP): Performed by: STUDENT IN AN ORGANIZED HEALTH CARE EDUCATION/TRAINING PROGRAM

## 2024-08-07 PROCEDURE — C1713 ANCHOR/SCREW BN/BN,TIS/BN: HCPCS | Performed by: ORTHOPAEDIC SURGERY

## 2024-08-07 PROCEDURE — 81025 URINE PREGNANCY TEST: CPT | Performed by: ORTHOPAEDIC SURGERY

## 2024-08-07 PROCEDURE — 3600000004 HC OR TIME - INITIAL BASE CHARGE - PROCEDURE LEVEL FOUR: Performed by: ORTHOPAEDIC SURGERY

## 2024-08-07 PROCEDURE — 3700000001 HC GENERAL ANESTHESIA TIME - INITIAL BASE CHARGE: Performed by: ORTHOPAEDIC SURGERY

## 2024-08-07 PROCEDURE — 3700000002 HC GENERAL ANESTHESIA TIME - EACH INCREMENTAL 1 MINUTE: Performed by: ORTHOPAEDIC SURGERY

## 2024-08-07 DEVICE — Ø10X 30MM BC IF SCRW, VENTED
Type: IMPLANTABLE DEVICE | Site: KNEE | Status: FUNCTIONAL
Brand: ARTHREX®

## 2024-08-07 DEVICE — Ø7X 20MM BC IF SCRW, VENTED
Type: IMPLANTABLE DEVICE | Site: KNEE | Status: FUNCTIONAL
Brand: ARTHREX®

## 2024-08-07 DEVICE — DISPS KIT,TRANS-TIB ACL W/O  SAWBLD
Type: IMPLANTABLE DEVICE | Site: KNEE | Status: FUNCTIONAL
Brand: ARTHREX®

## 2024-08-07 DEVICE — SCREW, CANCELLOUS, LOW PROFILE
Type: IMPLANTABLE DEVICE | Site: KNEE | Status: FUNCTIONAL
Brand: ARTHREX®

## 2024-08-07 DEVICE — IMPLANTABLE DEVICE: Type: IMPLANTABLE DEVICE | Site: KNEE | Status: FUNCTIONAL

## 2024-08-07 RX ORDER — HYDROMORPHONE HYDROCHLORIDE 1 MG/ML
INJECTION, SOLUTION INTRAMUSCULAR; INTRAVENOUS; SUBCUTANEOUS AS NEEDED
Status: DISCONTINUED | OUTPATIENT
Start: 2024-08-07 | End: 2024-08-07

## 2024-08-07 RX ORDER — SODIUM CHLORIDE 0.9 G/100ML
IRRIGANT IRRIGATION AS NEEDED
Status: DISCONTINUED | OUTPATIENT
Start: 2024-08-07 | End: 2024-08-07 | Stop reason: HOSPADM

## 2024-08-07 RX ORDER — CEFAZOLIN SODIUM 2 G/100ML
2 INJECTION, SOLUTION INTRAVENOUS ONCE
Status: COMPLETED | OUTPATIENT
Start: 2024-08-07 | End: 2024-08-07

## 2024-08-07 RX ORDER — DIPHENHYDRAMINE HYDROCHLORIDE 50 MG/ML
12.5 INJECTION INTRAMUSCULAR; INTRAVENOUS ONCE AS NEEDED
Status: DISCONTINUED | OUTPATIENT
Start: 2024-08-07 | End: 2024-08-07 | Stop reason: HOSPADM

## 2024-08-07 RX ORDER — DROPERIDOL 2.5 MG/ML
0.62 INJECTION, SOLUTION INTRAMUSCULAR; INTRAVENOUS ONCE AS NEEDED
Status: DISCONTINUED | OUTPATIENT
Start: 2024-08-07 | End: 2024-08-07 | Stop reason: HOSPADM

## 2024-08-07 RX ORDER — LIDOCAINE HYDROCHLORIDE 10 MG/ML
0.1 INJECTION, SOLUTION EPIDURAL; INFILTRATION; INTRACAUDAL; PERINEURAL ONCE
Status: DISCONTINUED | OUTPATIENT
Start: 2024-08-07 | End: 2024-08-07 | Stop reason: HOSPADM

## 2024-08-07 RX ORDER — ONDANSETRON HYDROCHLORIDE 2 MG/ML
INJECTION, SOLUTION INTRAVENOUS AS NEEDED
Status: DISCONTINUED | OUTPATIENT
Start: 2024-08-07 | End: 2024-08-07

## 2024-08-07 RX ORDER — SODIUM CHLORIDE 9 MG/ML
100 INJECTION, SOLUTION INTRAVENOUS CONTINUOUS
Status: DISCONTINUED | OUTPATIENT
Start: 2024-08-07 | End: 2024-08-07 | Stop reason: HOSPADM

## 2024-08-07 RX ORDER — PROPOFOL 10 MG/ML
INJECTION, EMULSION INTRAVENOUS AS NEEDED
Status: DISCONTINUED | OUTPATIENT
Start: 2024-08-07 | End: 2024-08-07

## 2024-08-07 RX ORDER — GLYCOPYRROLATE 0.2 MG/ML
INJECTION INTRAMUSCULAR; INTRAVENOUS AS NEEDED
Status: DISCONTINUED | OUTPATIENT
Start: 2024-08-07 | End: 2024-08-07

## 2024-08-07 RX ORDER — LIDOCAINE HCL/PF 100 MG/5ML
SYRINGE (ML) INTRAVENOUS AS NEEDED
Status: DISCONTINUED | OUTPATIENT
Start: 2024-08-07 | End: 2024-08-07

## 2024-08-07 RX ORDER — ROPIVACAINE HYDROCHLORIDE 2 MG/ML
20 INJECTION, SOLUTION EPIDURAL; INFILTRATION; PERINEURAL ONCE
Status: COMPLETED | OUTPATIENT
Start: 2024-08-07 | End: 2024-08-07

## 2024-08-07 RX ORDER — MIDAZOLAM HYDROCHLORIDE 1 MG/ML
INJECTION, SOLUTION INTRAMUSCULAR; INTRAVENOUS AS NEEDED
Status: DISCONTINUED | OUTPATIENT
Start: 2024-08-07 | End: 2024-08-07

## 2024-08-07 RX ORDER — FENTANYL CITRATE 50 UG/ML
50 INJECTION, SOLUTION INTRAMUSCULAR; INTRAVENOUS EVERY 5 MIN PRN
Status: DISCONTINUED | OUTPATIENT
Start: 2024-08-07 | End: 2024-08-07 | Stop reason: HOSPADM

## 2024-08-07 RX ORDER — MEPERIDINE HYDROCHLORIDE 25 MG/ML
12.5 INJECTION INTRAMUSCULAR; INTRAVENOUS; SUBCUTANEOUS EVERY 10 MIN PRN
Status: DISCONTINUED | OUTPATIENT
Start: 2024-08-07 | End: 2024-08-07 | Stop reason: HOSPADM

## 2024-08-07 RX ORDER — LABETALOL HYDROCHLORIDE 5 MG/ML
5 INJECTION, SOLUTION INTRAVENOUS ONCE AS NEEDED
Status: DISCONTINUED | OUTPATIENT
Start: 2024-08-07 | End: 2024-08-07 | Stop reason: HOSPADM

## 2024-08-07 RX ORDER — FENTANYL CITRATE 50 UG/ML
INJECTION, SOLUTION INTRAMUSCULAR; INTRAVENOUS AS NEEDED
Status: DISCONTINUED | OUTPATIENT
Start: 2024-08-07 | End: 2024-08-07

## 2024-08-07 RX ORDER — OXYCODONE HYDROCHLORIDE 5 MG/1
5 TABLET ORAL EVERY 4 HOURS PRN
Status: DISCONTINUED | OUTPATIENT
Start: 2024-08-07 | End: 2024-08-07 | Stop reason: HOSPADM

## 2024-08-07 RX ORDER — KETOROLAC TROMETHAMINE 30 MG/ML
INJECTION, SOLUTION INTRAMUSCULAR; INTRAVENOUS AS NEEDED
Status: DISCONTINUED | OUTPATIENT
Start: 2024-08-07 | End: 2024-08-07

## 2024-08-07 RX ORDER — SODIUM CHLORIDE, SODIUM LACTATE, POTASSIUM CHLORIDE, CALCIUM CHLORIDE 600; 310; 30; 20 MG/100ML; MG/100ML; MG/100ML; MG/100ML
100 INJECTION, SOLUTION INTRAVENOUS CONTINUOUS
Status: DISCONTINUED | OUTPATIENT
Start: 2024-08-07 | End: 2024-08-07 | Stop reason: HOSPADM

## 2024-08-07 ASSESSMENT — COLUMBIA-SUICIDE SEVERITY RATING SCALE - C-SSRS
6. HAVE YOU EVER DONE ANYTHING, STARTED TO DO ANYTHING, OR PREPARED TO DO ANYTHING TO END YOUR LIFE?: NO
1. IN THE PAST MONTH, HAVE YOU WISHED YOU WERE DEAD OR WISHED YOU COULD GO TO SLEEP AND NOT WAKE UP?: NO
2. HAVE YOU ACTUALLY HAD ANY THOUGHTS OF KILLING YOURSELF?: NO

## 2024-08-07 ASSESSMENT — PAIN SCALES - GENERAL
PAINLEVEL_OUTOF10: 0 - NO PAIN
PAINLEVEL_OUTOF10: 0 - NO PAIN
PAINLEVEL_OUTOF10: 3
PAINLEVEL_OUTOF10: 0 - NO PAIN
PAINLEVEL_OUTOF10: 3
PAINLEVEL_OUTOF10: 4

## 2024-08-07 ASSESSMENT — PAIN - FUNCTIONAL ASSESSMENT
PAIN_FUNCTIONAL_ASSESSMENT: 0-10

## 2024-08-07 ASSESSMENT — PAIN DESCRIPTION - DESCRIPTORS
DESCRIPTORS: DULL;PRESSURE;DISCOMFORT
DESCRIPTORS: DULL;DISCOMFORT;PRESSURE
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
## 2024-08-07 NOTE — ANESTHESIA PROCEDURE NOTES
Peripheral Block    Patient location during procedure: pre-op  Start time: 8/7/2024 9:10 AM  End time: 8/7/2024 9:20 AM  Reason for block: at surgeon's request and post-op pain management  Staffing  Performed: attending   Authorized by: Mehran Richardson DO    Performed by: Mehran Richardson DO  Preanesthetic Checklist  Completed: patient identified, IV checked, site marked, risks and benefits discussed, surgical consent, monitors and equipment checked, pre-op evaluation and timeout performed   Timeout performed at:   Peripheral Block  Patient position: laying flat  Prep: ChloraPrep  Patient monitoring: heart rate, continuous pulse ox and cardiac monitor  Injection technique: single-shot  Guidance: ultrasound guided  Local infiltration: lidocaine  Infiltration strength: 1 %  Dose: 2 mL  Needle  Needle gauge: 21 G  Needle length: 10 cm  Needle localization: ultrasound guidance     image stored in chart  Assessment  Injection assessment: negative aspiration for heme, no paresthesia on injection, incremental injection and local visualized surrounding nerve on ultrasound  Heart rate change: no  Additional Notes  Time out performed. 20 ml .2 % Ropivacaine used in divided doses. Patient tolerated procedure well.

## 2024-08-07 NOTE — ANESTHESIA PREPROCEDURE EVALUATION
Patient: Rylee Bear    Procedure Information       Date/Time: 08/07/24 0915    Procedure: ARTHROSCOPY ANTERIOR CRUCIATE LIGAMENT RECONSTRUCTION BONE TENDON BONE (Right: Knee) - ARTHREX ACL RECONSTRUCTION, DBM BONE PUTTY, FEMORAL AND SCIATIC NERVE BLOCK    Location: [facility] / [facility]    Surgeons: Kelton Kothari MD            Relevant Problems   ID   (+) Herpes zoster   (+) Viral upper respiratory tract infection       Clinical information reviewed:   Tobacco  Allergies  Meds   Med Hx  Surg Hx  OB Status  Fam Hx  Soc   Hx        NPO Detail:  NPO/Void Status  Carbohydrate Drink Given Prior to Surgery? : N  Date of Last Liquid: 08/06/24  Time of Last Liquid: 2030  Date of Last Solid: 08/06/24  Time of Last Solid: 2030  Last Intake Type: Clear fluids  Time of Last Void: 0655         Physical Exam    Airway  Mallampati: II     Cardiovascular   Rhythm: regular  Rate: normal     Dental    Pulmonary - normal exam     Abdominal - normal exam             Anesthesia Plan    History of general anesthesia?: yes  History of complications of general anesthesia?: no    ASA 2     general and regional     intravenous induction   Postoperative administration of opioids is intended.  Anesthetic plan and risks discussed with patient.

## 2024-08-07 NOTE — ANESTHESIA POSTPROCEDURE EVALUATION
Patient: Rylee Bear    Procedure Summary       Date: 08/07/24 Room / Location: [facility]    Anesthesia Start: 0933 Anesthesia Stop: 1212    Procedure: ARTHROSCOPY ANTERIOR CRUCIATE LIGAMENT RECONSTRUCTION, BONE TENDON BONE/ MENISCAL REPAIR (Right: Knee) Diagnosis:       Sprain of anterior cruciate ligament of right knee, initial encounter      (Sprain of anterior cruciate ligament of right knee, initial encounter [S83.511A])    Surgeons: Kelton Kothari MD Responsible Provider: Mehran Richardson DO    Anesthesia Type: general ASA Status: 2            Anesthesia Type: general    Vitals Value Taken Time   /68 08/07/24 1214   Temp 37.2 08/07/24 1214   Pulse 104 08/07/24 1213   Resp 14 08/07/24 1213   SpO2 97 % 08/07/24 1213   Vitals shown include unfiled device data.    Anesthesia Post Evaluation    Patient location during evaluation: PACU  Patient participation: complete - patient participated  Level of consciousness: awake and alert  Pain management: adequate  Airway patency: patent  Cardiovascular status: acceptable, blood pressure returned to baseline and hemodynamically stable  Respiratory status: acceptable, nonlabored ventilation, spontaneous ventilation, room air and unassisted  Hydration status: acceptable  Postoperative Nausea and Vomiting: none      There were no known notable events for this encounter.

## 2024-08-07 NOTE — ANESTHESIA PROCEDURE NOTES
Airway  Date/Time: 8/7/2024 9:40 AM  Urgency: elective    Airway not difficult    Staffing  Performed: CRNA   Authorized by: Mehran Richardson DO    Performed by: TIRSO Amador-MARKOS  Patient location during procedure: OR    Indications and Patient Condition  Indications for airway management: anesthesia and airway protection  Spontaneous Ventilation: absent  Sedation level: deep  Preoxygenated: yes  Mask difficulty assessment: 0 - not attempted    Final Airway Details  Final airway type: supraglottic airway      Successful airway: classic  Size 4     Number of attempts at approach: 1

## 2024-08-07 NOTE — OP NOTE
ARTHROSCOPY ANTERIOR CRUCIATE LIGAMENT RECONSTRUCTION, BONE TENDON BONE/ MENISCAL REPAIR (R) Operative Note     Date: 2024  OR Location: [facility]    Name: Rylee Bear : 1985, Age: 39 y.o., MRN: [id number], Sex: female    Diagnosis  Pre-op Diagnosis      * Sprain of anterior cruciate ligament of right knee, initial encounter [S83.511A] Post-op Diagnosis     * Sprain of anterior cruciate ligament of right knee, initial encounter [S83.511A]     Procedures  ARTHROSCOPY ANTERIOR CRUCIATE LIGAMENT RECONSTRUCTION, BONE TENDON BONE/ MENISCAL REPAIR  [id number] - [place] ARTHRS AIDED ANT CRUCIATE LIGM RPR/AGMNTJ/RCNSTJ  1.  Right knee arthroscopically assisted ACL reconstruction BTB autograft  2.  Right knee arthroscopic medial meniscus repair  Surgeons      * Kelton Kothari - Primary    Resident/Fellow/Other Assistant:  Surgeons and Role:  * No surgeons found with a matching role *Perla Wilkinson PA-C    Procedure Summary  Anesthesia: General  ASA: II  Anesthesia Staff: Anesthesiologist: Mehran Richardson DO  CRNA: TIRSO Amador-CRNA  Estimated Blood Loss: 50 mL  Intra-op Medications:   Administrations occurring from 0915 to 1115 on 24:   Medication Name Total Dose   sodium chloride 0.9 % irrigation solution 6,000 mL   ceFAZolin (Ancef) 2 g in dextrose (iso)  mL 2 g   dexAMETHasone (PF) (Decadron) 5 mg, ropivacaine (Naropin) 5 mg/mL (0.5 %) 100 mg injection Cannot be calculated   ropivacaine (Naropin) 0.2 % injection solution 40 mg 40 mg              Anesthesia Record               Intraprocedure I/O Totals       None           Specimen: No specimens collected     Staff:   Circulator: Denise Solis Person: Olya Macias Circulator: Ara         Drains and/or Catheters: * None in log *    Tourniquet Times:     Total Tourniquet Time Documented:  Thigh (Right) - 16 minutes  Total: Thigh (Right) - 16 minutes      Implants:  Implants       Type Name Action Serial No.      Screw SCREW,  BIOCOMPOSITE, FAST THREAD, 7 X 20MM - [id number] Implanted      Screw GUIDE PIN KIT, TRANSTIBIAL ACL W/OUT SAW BLADE - [id number] Implanted      Screw SCREW, BIOCOMPOSITE, FAST THREAD, 10 X 30MM - [id number] Implanted             Indications: 39-year-old female injured her right knee resulting in bleed ACL tear as well as a medial meniscus tear like to receive surgery for ACL reconstruction with BTB autograft and meniscus repair versus meniscectomy    Risks benefits and alternatives to surgery were discussed including but not limited to Infection, bleeding, neurovascular injury, pain and dysfunction, hardware related complications including cutout failure breakage, loss of function, motion, and permanent disability/persistent pain as well as the cardiovascular and pulmonary complications from anesthesia including death and DVT. Patient and family accept these risks.  We discussed specifically  hardware complications including cut out, failure, breakage, recurrent instability, incomplete repair, retear, graft related complications, intraoperative decision in regards to meniscus and cartilage pathology and the possible need for future revision or staged surgeries.    Patient identified in the preop area.  Right lower extremity marked and confirmed with patient as the operative site.  Brought back to the operating room and anesthetized under general anesthesia.  Right lower extremity was then prepped and draped in standard sterile fashion.  Patient, site and procedure were confirmed with timeout.  Everyone in the room agreed.  Preop antibiotics were given.    Ligament exam 2+ Lachman  We then proceeded with a BTB autograft harvest.  Tourniquet was inflated to 250 mmHg.  We then made a midline incision over the patellar tendon.  We incised down to the peritenon.  We then identified the center portion of the patellar tendon.  We took a 10 x 20 mm bone plug out of the patella and the tibia respectively.  No evidence of  fracture.  Tourniquet was deflated.  PA/first assistant then prepared the graft on the back table for implantation  We then made standard medial and lateral arthroscopy portal and the scope was introduced.  Medial joint space : Patient had high-grade a vertical tear of the posterior horn the medial meniscus over 1.5 cm area.  He is incomplete but greater than 80% tear and unstable.  There is an trephinated along the meniscocapsular junction and debrided the tear edges.  We circumferentially placed 3 separate hay bale type sutures with a scorpion device.  0 FiberWire was essentially tied at the superior capsule.  This dramatic reduce the tear edges.  Patient has small cartilage softening centrally within the medial femoral condyle  Lateral joint space: Normal  Patellofemoral joint space: Normal  Patient had a notable complete rupture in the ACL.  The remaining stump was present was then resected off its femur and tibial attachments.  Identified the femoral and the tibial insertion for the ACL.  There are anteromedial portal, we hyperflexed the knee and created a 10 x 25 mm socket on the femur.  We then created a separate anteromedial fascial incision.  Our ACL drill guide was placed.  Found the center of the tibial insertion on the ACL.  We then placed a flip cutter bicortically.  Once were satisfied with the location we then created a full 11 mm tibial tunnel.  First assistant Perla Wilkinson PA-C prepared on the back table and BTB autograft with a 10 x 20 mm bone plugs.  It was then relayed across the tibia, notch and seated into the femoral socket.  We then placed anterior interference screw 7 mm.  Had excellent bite and was firmly fixing the graft on the lateral wall.  Then tension the graft that remained stable without evidence of notch or lateral wall impingement.  Then through an open technique we placed a interference screw.  The knee was held approximate 30 degrees of flexion while the tibial side the graft  was tensioned.  We then placed a interference screw 10 mm.  Had excellent bite and was capturing the tibial side the graft.  We performed a Lachman exam which was a +0 Lachman and had excellent endpoint.  Then placed a low-profile backup post on the tibia.  We unicortically drilled a 6.5 millimeter screw.  Tibial side of the sutures were then tied over the post.  Screw was advanced to flush against the tibia.  Scope was reintroduced into the knee.  No evidence of hardware failure and the graft appeared to be intact and normal tension.  This concluded the procedure.  Wounds were irrigated with normal saline.  Placed DBM putty and the tibial and patellar defects.  Fascia was closed with 0 Vicryl, 2-0 Vicryl subcutaneous, Monocryl in the skin.  Sterile dressings were applied.  Patient placed into a knee immobilizer and sent to the PACU in stable condition.    Perla Wilkinson PA-C was present throughout the entire case. Given the nature of the disease process and the procedure, a skilled surgical first assistant was necessary during the case. The assistant was necessary to hold retractors and to manipulate the extremity during the procedure. A certified scrub tech was at the back table managing the instruments and supplies for the surgical case.  Kelton Kothari  Phone Number: [phone]

## 2024-08-07 NOTE — ANESTHESIA PROCEDURE NOTES
Peripheral Block    Patient location during procedure: pre-op  Start time: 8/7/2024 9:10 AM  End time: 8/7/2024 9:20 AM  Reason for block: at surgeon's request and post-op pain management  Staffing  Performed: attending   Authorized by: Mehran Richardson DO    Performed by: Mehran Richardson DO  Preanesthetic Checklist  Completed: patient identified, IV checked, site marked, risks and benefits discussed, surgical consent, monitors and equipment checked, pre-op evaluation and timeout performed   Timeout performed at:   Peripheral Block  Patient position: laying flat  Prep: ChloraPrep  Patient monitoring: heart rate, continuous pulse ox and cardiac monitor  Block type: adductor canal  Laterality: right  Injection technique: single-shot  Guidance: ultrasound guided  Local infiltration: lidocaine  Infiltration strength: 1 %  Dose: 2 mL  Needle  Needle gauge: 21 G  Needle length: 10 cm  Needle localization: ultrasound guidance     image stored in chart  Assessment  Injection assessment: negative aspiration for heme, no paresthesia on injection, incremental injection and local visualized surrounding nerve on ultrasound  Heart rate change: no  Additional Notes  Time out performed. 20 ml .5 % Ropivacaine with 5mg Decadron used in divided doses. Patient tolerated procedure well.

## 2024-08-12 ENCOUNTER — EVALUATION (OUTPATIENT)
Dept: PHYSICAL THERAPY | Facility: CLINIC | Age: 39
End: 2024-08-12
Payer: COMMERCIAL

## 2024-08-12 DIAGNOSIS — Z98.890 S/P RECONSTRUCTION OF ACL OF RIGHT KNEE USING HAMSTRING AUTOGRAFT: ICD-10-CM

## 2024-08-12 DIAGNOSIS — R26.2 DIFFICULTY WALKING: Primary | ICD-10-CM

## 2024-08-12 DIAGNOSIS — M25.561 ACUTE PAIN OF RIGHT KNEE: ICD-10-CM

## 2024-08-12 PROCEDURE — 97161 PT EVAL LOW COMPLEX 20 MIN: CPT | Mod: GP | Performed by: PHYSICAL THERAPIST

## 2024-08-12 PROCEDURE — 97110 THERAPEUTIC EXERCISES: CPT | Mod: GP | Performed by: PHYSICAL THERAPIST

## 2024-08-12 ASSESSMENT — ENCOUNTER SYMPTOMS
OCCASIONAL FEELINGS OF UNSTEADINESS: 1
LOSS OF SENSATION IN FEET: 0
DEPRESSION: 0

## 2024-08-12 NOTE — PROGRESS NOTES
History of Present Illness:  Date of Surgery: 08/07/24 right knee ACL reconstruction, BTB autograft, and meniscus repair. Patient had high-grade a vertical tear of the posterior horn the medial meniscus over 1.5 cm area. Patient had a notable complete rupture in the ACL.  She is doing well and just started PT at [facility] on Monday.     Exam:  Mild effusion  Incisions healed  Good range of motion  No calf tenderness   Negative Ty's test  Distal neurovascular exam intact    Assessment:  Patient status post right knee ACL reconstruction    Plan:  Start motion at PT from 0 to 90 for 4 weeks then progress.  Non-weight bearing for a total of 6 weeks.   Follow-up 4wks              Scribe Attestation  By signing my name below, IEle Scribe   attest that this documentation has been prepared under the direction and in the presence of Kelton Kothari MD.
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
## 2024-08-15 ENCOUNTER — OFFICE VISIT (OUTPATIENT)
Dept: ORTHOPEDIC SURGERY | Facility: CLINIC | Age: 39
End: 2024-08-15
Payer: COMMERCIAL

## 2024-08-15 ENCOUNTER — TREATMENT (OUTPATIENT)
Dept: PHYSICAL THERAPY | Facility: CLINIC | Age: 39
End: 2024-08-15
Payer: COMMERCIAL

## 2024-08-15 DIAGNOSIS — M25.561 ACUTE PAIN OF RIGHT KNEE: Primary | ICD-10-CM

## 2024-08-15 DIAGNOSIS — S83.511A RUPTURE OF ANTERIOR CRUCIATE LIGAMENT OF RIGHT KNEE, INITIAL ENCOUNTER: Primary | ICD-10-CM

## 2024-08-15 PROCEDURE — 99211 OFF/OP EST MAY X REQ PHY/QHP: CPT | Performed by: ORTHOPAEDIC SURGERY

## 2024-08-15 PROCEDURE — 97016 VASOPNEUMATIC DEVICE THERAPY: CPT | Mod: GP | Performed by: PHYSICAL THERAPIST

## 2024-08-15 PROCEDURE — 97110 THERAPEUTIC EXERCISES: CPT | Mod: GP | Performed by: PHYSICAL THERAPIST

## 2024-08-15 PROCEDURE — 99024 POSTOP FOLLOW-UP VISIT: CPT | Performed by: ORTHOPAEDIC SURGERY

## 2024-08-15 PROCEDURE — L1833 KO ADJ JNT POS R SUP PRE OTS: HCPCS | Performed by: ORTHOPAEDIC SURGERY

## 2024-08-15 PROCEDURE — 97140 MANUAL THERAPY 1/> REGIONS: CPT | Mod: GP | Performed by: PHYSICAL THERAPIST

## 2024-08-15 NOTE — PROGRESS NOTES
"Treatment note  Physical Therapy Treatment  Patient Name:Rylee Bear  MRN:[id number]  Today's Date:8/15/2024  Referred by: Kelton Kothari  Time Calculation  Start Time: 1650  Stop Time: 1800  Time Calculation (min): 70 min    Therapy Diagnosis  1. Acute pain of right knee         Assessment: Patient able to achieve 90 degrees of knee flexion today with mild increase in pain. Continues to lack 5 deg knee extension. Initiated NMES and BFR for strengthening of quad and IASTM to R HS and gastroc mm. For promotion of full knee extension. Patient challenged with SLR BFR unable to complete 3rd and 4th sets of 15 max at 10 reps. Patient demonstrating improving quad contraction this date. Continue to focus on cuing forfull knee extension and quad activation as part of HEP.    Plan: Continue with current protocol, begin to progress (per dates) as tolerated    Insurance  Visit number:  2   Approved number of visits: 40  Onset Date: 8/8/2024    Precautions/Fall Risk:weight bearing status: NWB for 6 weeks(9/18 then WBAT) Pacemaker no  Seizures No  Post Op Movement/Restrictions Yes 0-90 deg for 4 weeks (9/4 then progress with AROM as tolerated)    Subjective/Pain: Patient reports soreness following IE   Current Pain Level (0-10): 3    HEP Compliance: Excellent    Objective/Outcome Measures:  NWB with bilateral axillary crutches  Knee flexion: 90 deg  Knee extension: lacking 5 deg    Treatment  Therapeutic Exercise 50 minutes  Heel slides with manual assist: to 90 deg  NMES + with heel prop + QS: 10 min  Long sit H: 30\" x 3  Long sit Calf s: 30\" x 3  Hook lying Clamshell Blue TB:   BFR LOP 80% 30:15:15:15 SLR - 8 min     Manual Therapy 10 minutes -IASTM to HS and gastrocs to promote terminal knee extension, patellar mobs, extension mobs GradeI/II/II    Modalities   Vasopneumatic Device     10 minutes -  34 deg mod compression with leg elevated for post treatment swelling       Goals:  Lower Extremity Goals  Lower Extremity " Goals: By discharge, patient will:  1) Demonstrate independence with home exercise program for overall symptom management  2) Increase overall exercise tolerance without adverse reaction or increased chief complaint  3) Increase ROM of the R knee to 90 degrees (within 2-4 weeks) and WNL within 6-8 weeks in order to improve the ability to perform essential ADLs  4) Increase strength of the R LE to 4/5 (within 6 weeks) and 5/5 within 12 weeks in order to improve the ability to perform essential ADLs  5) Report decrease in pain by >= 2 points to meet MCID  6) Increase score of LEFS by > 9 points to meet the MCID  7) Ascend and descend 1 flight of stairs reciprocally and safely without an increase in symptoms  8) Ambulate x WBAT with assistive device without an increase in symptoms at 6 weeks.  9) Ambulate x community distances without assistive device and without compensation or pain within 8-10 weeks.  10) Be able to perform the ADL of squatting to floor to  object without an increase or production of symptoms

## 2024-08-15 NOTE — LETTER
August 15, 2024     Patient: Rylee Bear   YOB: 1985   Date of Visit: 8/15/2024       To Whom it May Concern:    Rylee Bear was seen in my clinic on 8/15/2024. She  should remain out of work until 09/04/24 .    If you have any questions or concerns, please don't hesitate to call.                Kelton Kothari MD

## 2024-08-19 ENCOUNTER — APPOINTMENT (OUTPATIENT)
Dept: PHYSICAL THERAPY | Facility: CLINIC | Age: 39
End: 2024-08-19
Payer: COMMERCIAL

## 2024-08-22 ENCOUNTER — TREATMENT (OUTPATIENT)
Dept: PHYSICAL THERAPY | Facility: CLINIC | Age: 39
End: 2024-08-22
Payer: COMMERCIAL

## 2024-08-22 DIAGNOSIS — M25.561 ACUTE PAIN OF RIGHT KNEE: Primary | ICD-10-CM

## 2024-08-22 DIAGNOSIS — S83.511D SPRAIN OF ANTERIOR CRUCIATE LIGAMENT OF RIGHT KNEE, SUBSEQUENT ENCOUNTER: ICD-10-CM

## 2024-08-22 PROCEDURE — 97110 THERAPEUTIC EXERCISES: CPT | Mod: GP | Performed by: PHYSICAL THERAPIST

## 2024-08-22 PROCEDURE — 97016 VASOPNEUMATIC DEVICE THERAPY: CPT | Mod: GP | Performed by: PHYSICAL THERAPIST

## 2024-08-22 NOTE — PROGRESS NOTES
Treatment note  Physical Therapy Treatment  Patient Name:Rylee Bear  MRN:[id number]  Today's Date:8/22/2024  Referred by: Kelton Kothari  Time Calculation  Start Time: 1150  Stop Time: 1245  Time Calculation (min): 55 min    Therapy Diagnosis  1. Acute pain of right knee    2. Sprain of anterior cruciate ligament of right knee, subsequent encounter         Assessment: Patient continues to be able to achieve 90 degrees of knee flexion today without difficulty. Continues to lack slight knee extension. Initiated treatment with BFRT to R quad in supine performing SLR for strengthening protocol. NMES also utilized to R VL and VMO for quad strengthening for promotion of full knee extension. Patient with less challenged with SLR BFRT today and able to compete all reps in protocol.  Patient demonstrating improving quad contraction this date. She is compliant with HEP and WB restrictions. Initiated glute med strengthening on involved side with noted fatigue. Continue to focus on cuing for full knee extension and quad activation as part of HEP.    Plan: Continue with current protocol, begin to progress (per dates) as tolerated    Insurance  Visit number:  3   Approved number of visits: 40  Onset Date: 8/8/2024    Precautions/Fall Risk:weight bearing status: NWB for 6 weeks(9/18 then WBAT) Pacemaker no  Seizures No  Post Op Movement/Restrictions Yes 0-90 deg for 4 weeks (9/4 then progress with AROM as tolerated)    Subjective/Pain: Patient she feels she is having a very difficult time activating her quad muscle. Some stiffness when initiating ROM and stretching but otherwise pain free.   Current Pain Level (0-10): 0    HEP Compliance: Excellent    Objective/Outcome Measures:  NWB with bilateral axillary crutches  Knee flexion: 90 deg  Knee extension: lacking 5 deg    Treatment  Therapeutic Exercise 45 minutes  Heel slides with manual assist: to 90 deg  NMES + with heel prop + QS/SLR: 10 min (5'/5') Bulgarian intensity 45 mA,  "ramp 2 sec, on 10 sec, off 20 sec  Long sit H: 30\" x 3  Sidelying Clamshell Blue TB: (feet slightly elevated) 2 x 10   Side lying hip ABD: 3 x 10   BFR LOP 80% 30:15:15:15 SLR - 8 min     Manual Therapy   minutes -IASTM to HS and gastrocs to promote terminal knee extension, patellar mobs, extension mobs GradeI/II/II    Modalities   Vasopneumatic Device     10 minutes -  34 deg mod compression with leg elevated for post treatment swelling       Goals:  Lower Extremity Goals  Lower Extremity Goals: By discharge, patient will:  1) Demonstrate independence with home exercise program for overall symptom management  2) Increase overall exercise tolerance without adverse reaction or increased chief complaint  3) Increase ROM of the R knee to 90 degrees (within 2-4 weeks) and WNL within 6-8 weeks in order to improve the ability to perform essential ADLs  4) Increase strength of the R LE to 4/5 (within 6 weeks) and 5/5 within 12 weeks in order to improve the ability to perform essential ADLs  5) Report decrease in pain by >= 2 points to meet MCID  6) Increase score of LEFS by > 9 points to meet the MCID  7) Ascend and descend 1 flight of stairs reciprocally and safely without an increase in symptoms  8) Ambulate x WBAT with assistive device without an increase in symptoms at 6 weeks.  9) Ambulate x community distances without assistive device and without compensation or pain within 8-10 weeks.  10) Be able to perform the ADL of squatting to floor to  object without an increase or production of symptoms    "

## 2024-08-26 ENCOUNTER — TREATMENT (OUTPATIENT)
Dept: PHYSICAL THERAPY | Facility: CLINIC | Age: 39
End: 2024-08-26
Payer: COMMERCIAL

## 2024-08-26 ENCOUNTER — APPOINTMENT (OUTPATIENT)
Dept: PHYSICAL THERAPY | Facility: CLINIC | Age: 39
End: 2024-08-26
Payer: COMMERCIAL

## 2024-08-26 DIAGNOSIS — Z98.890 S/P RECONSTRUCTION OF ACL OF RIGHT KNEE USING HAMSTRING AUTOGRAFT: ICD-10-CM

## 2024-08-26 DIAGNOSIS — R26.2 DIFFICULTY WALKING: ICD-10-CM

## 2024-08-26 DIAGNOSIS — M25.561 ACUTE PAIN OF RIGHT KNEE: ICD-10-CM

## 2024-08-26 PROCEDURE — 97140 MANUAL THERAPY 1/> REGIONS: CPT | Mod: GP | Performed by: PHYSICAL THERAPIST

## 2024-08-26 PROCEDURE — 97110 THERAPEUTIC EXERCISES: CPT | Mod: GP | Performed by: PHYSICAL THERAPIST

## 2024-08-26 NOTE — PROGRESS NOTES
Treatment note  Physical Therapy Treatment  Patient Name:Rylee Bear  MRN:[id number]  Today's Date:8/26/2024  Referred by: Kelton Kothari  Time Calculation  Start Time: 1615  Stop Time: 1700  Time Calculation (min): 45 min    Therapy Diagnosis  1. S/P reconstruction of ACL of right knee using hamstring autograft    2. Difficulty walking    3. Acute pain of right knee         Assessment: Patient able to actively reach >90 deg today without pain. She was able to complete BFRT within 4 minutes today showing improved time/strength.  Continues to lack slight knee extension which we continue to work on through IASTM and stretching.  Patient demonstrating improving quad contraction and leg strength this date. She is compliant with HEP and WB restrictions. Initiated hip extension and prone HS curls to 90 deg today wihth minimal challenge. . Continue to focus on cuing for full knee extension and quad activation as part of HEP.    Plan: Continue with current protocol, begin to progress (per dates) as tolerated - 9/4 (4 weeks progress to next phase of protocol     Insurance  Visit number:  4   Approved number of visits: 40  Onset Date: 8/8/2024    Precautions/Fall Risk:weight bearing status: NWB for 6 weeks(9/18 then WBAT) Pacemaker no  Seizures No  Post Op Movement/Restrictions Yes 0-90 deg for 4 weeks (9/4 then progress with AROM as tolerated)    Subjective/Pain: Patient she feels she is still having a hard time sleeping/getting comfortable because she sleeps on her side. She is feeling ready to progress from crutches but continues to follow NWbing restrictions.   Current Pain Level (0-10): 0    HEP Compliance: Excellent    Objective/Outcome Measures:  NWB with bilateral axillary crutches  Knee flexion: 90 deg  Knee extension: lacking 5 deg    Treatment  Therapeutic Exercise 35 minutes  Heel slides 90 deg - x 20   Heel prop + QS/SLR: 10 min (5'/5') Romanian intensity 45 mA, ramp 2 sec, on 10 sec, off 20 sec  Long sit H:  "30\" x 3  Long sit Calf s: 60\" on R  BFR LOP 80% 30:15:15:15 SLR - 8 min (finished in 4)  LAQ to 60 deg against ISO resistance  Prone hip ext: 2 x 10   Prone HS curl to 90 deg: 2 x 10     Manual Therapy 10 minutes -IASTM to HS and gastrocs to promote terminal knee extension, patellar mobs, extension mobs GradeI/II/II    Modalities -Not today   Vasopneumatic Device       minutes -  34 deg mod compression with leg elevated for post treatment swelling       Goals:  Lower Extremity Goals  Lower Extremity Goals: By discharge, patient will:  1) Demonstrate independence with home exercise program for overall symptom management  2) Increase overall exercise tolerance without adverse reaction or increased chief complaint  3) Increase ROM of the R knee to 90 degrees (within 2-4 weeks) and WNL within 6-8 weeks in order to improve the ability to perform essential ADLs  4) Increase strength of the R LE to 4/5 (within 6 weeks) and 5/5 within 12 weeks in order to improve the ability to perform essential ADLs  5) Report decrease in pain by >= 2 points to meet MCID  6) Increase score of LEFS by > 9 points to meet the MCID  7) Ascend and descend 1 flight of stairs reciprocally and safely without an increase in symptoms  8) Ambulate x WBAT with assistive device without an increase in symptoms at 6 weeks.  9) Ambulate x community distances without assistive device and without compensation or pain within 8-10 weeks.  10) Be able to perform the ADL of squatting to floor to  object without an increase or production of symptoms    "

## 2024-08-29 ENCOUNTER — APPOINTMENT (OUTPATIENT)
Dept: PHYSICAL THERAPY | Facility: CLINIC | Age: 39
End: 2024-08-29
Payer: COMMERCIAL

## 2024-08-29 ENCOUNTER — TELEPHONE (OUTPATIENT)
Dept: ORTHOPEDIC SURGERY | Facility: CLINIC | Age: 39
End: 2024-08-29
Payer: COMMERCIAL

## 2024-08-29 NOTE — TELEPHONE ENCOUNTER
Patient has form that needs to be filled out for her RTW and she would like to discuss her rtw for Sep 4th

## 2024-09-04 ENCOUNTER — TREATMENT (OUTPATIENT)
Dept: PHYSICAL THERAPY | Facility: CLINIC | Age: 39
End: 2024-09-04
Payer: COMMERCIAL

## 2024-09-04 DIAGNOSIS — R26.2 DIFFICULTY WALKING: Primary | ICD-10-CM

## 2024-09-04 DIAGNOSIS — M25.561 ACUTE PAIN OF RIGHT KNEE: ICD-10-CM

## 2024-09-04 DIAGNOSIS — S83.511D SPRAIN OF ANTERIOR CRUCIATE LIGAMENT OF RIGHT KNEE, SUBSEQUENT ENCOUNTER: ICD-10-CM

## 2024-09-04 PROCEDURE — 97110 THERAPEUTIC EXERCISES: CPT | Mod: GP | Performed by: PHYSICAL THERAPIST

## 2024-09-04 NOTE — PROGRESS NOTES
"Treatment note  Physical Therapy Treatment  Patient Name:Rylee Bear  MRN:[id number]  Today's Date:9/4/2024  Referred by: Kelton Kothari  Time Calculation  Start Time: 1003  Stop Time: 1045  Time Calculation (min): 42 min    Therapy Diagnosis  1. Difficulty walking    2. Acute pain of right knee    3. Sprain of anterior cruciate ligament of right knee, subsequent encounter           Assessment: Patient able to make full rotation on upright bike today. Progressing through Phase 3 able to progress with A/AAROM knee flexion able to achieve 120 degrees with assist, denies pain but experiences tightness of the popliteal fossa. She continues to lack terminal knee extension despite ability to activate quad mm. SLR performed without quad lag. Her brace remains locked in 10 deg ext. Variations of quad sets performed today in attempts to get active contractions/quad firing. Patient remains NWB per protocol.       Plan: Progress to full ROM as tolerated, continue NWB until MD follow up on 9/12    Insurance  Visit number:  5   Approved number of visits: 40  Onset Date: 8/8/2024    Precautions/Fall Risk:weight bearing status: NWB for 6 weeks(9/18 then WBAT) Pacemaker no  Seizures No  Post Op Movement/Restrictions Yes 9/4 then progress with AROM as tolerated    Subjective/Pain: Patient denies pain today. Some stiffness when initiating stretches. She is motivated to return to driving and start weight bearing after MD follow up.   Current Pain Level (0-10): 0    HEP Compliance: Excellent    Objective/Outcome Measures:  NWB with bilateral axillary crutches  Knee flexion: 120 deg   Knee extension: lacking:    Treatment  Therapeutic Exercise 42 minutes  Upright Bike 7 minutes   Heel slides: 4 minutes   BFR LOP 80% 30:15:15:15 SLR - 8 min (finished in 4)  Heel prop + QS 2 x 10   QS+SLR: 2 x 10   Seated Long sit QS 5\" x 15   Seated HS s:   BFR LOP 80% 30:15:15:15 SLR - 8 min (finished in 4)  LAQ to 60 deg against ISO " resistance      Manual Therapy   minutes -IASTM to HS and gastrocs to promote terminal knee extension, patellar mobs, extension mobs GradeI/II/II    Modalities -Not today   Vasopneumatic Device       minutes -  34 deg mod compression with leg elevated for post treatment swelling       Goals:  Lower Extremity Goals  Lower Extremity Goals: By discharge, patient will:  1) Demonstrate independence with home exercise program for overall symptom management  2) Increase overall exercise tolerance without adverse reaction or increased chief complaint  3) Increase ROM of the R knee to 90 degrees (within 2-4 weeks) and WNL within 6-8 weeks in order to improve the ability to perform essential ADLs  4) Increase strength of the R LE to 4/5 (within 6 weeks) and 5/5 within 12 weeks in order to improve the ability to perform essential ADLs  5) Report decrease in pain by >= 2 points to meet MCID  6) Increase score of LEFS by > 9 points to meet the MCID  7) Ascend and descend 1 flight of stairs reciprocally and safely without an increase in symptoms  8) Ambulate x WBAT with assistive device without an increase in symptoms at 6 weeks.  9) Ambulate x community distances without assistive device and without compensation or pain within 8-10 weeks.  10) Be able to perform the ADL of squatting to floor to  object without an increase or production of symptoms

## 2024-09-10 NOTE — PROGRESS NOTES
History of Present Illness:  Date of Surgery: 08/07/24 right knee ACL reconstruction, BTB autograft, and meniscus repair. Patient had high-grade a vertical tear of the posterior horn the medial meniscus over 1.5 cm area. Patient had a notable complete rupture in the ACL.  She is doing well but does have some tightness when going in full extension.     Exam:  Mild effusion  Incisions clean dry intact  She has a 2°  flexion contracture  Good range of motion  No calf tenderness   Negative Ty's test  Distal neurovascular exam intact    Assessment:  Right knee ACL reconstruction    Plan:  Continue flexion contracture with therapy.   Gave her a handout today   Can start weightbearing next week.   Follow-up in 1 month with x-ray 2 view knee              Scribe Attestation  By signing my name below, IEle Scrjose   attest that this documentation has been prepared under the direction and in the presence of Kelton Kothari MD.

## 2024-09-12 ENCOUNTER — TREATMENT (OUTPATIENT)
Dept: PHYSICAL THERAPY | Facility: CLINIC | Age: 39
End: 2024-09-12
Payer: COMMERCIAL

## 2024-09-12 ENCOUNTER — OFFICE VISIT (OUTPATIENT)
Dept: ORTHOPEDIC SURGERY | Facility: CLINIC | Age: 39
End: 2024-09-12
Payer: COMMERCIAL

## 2024-09-12 DIAGNOSIS — Z98.890 S/P RECONSTRUCTION OF ACL OF RIGHT KNEE USING HAMSTRING AUTOGRAFT: ICD-10-CM

## 2024-09-12 DIAGNOSIS — S83.511A RUPTURE OF ANTERIOR CRUCIATE LIGAMENT OF RIGHT KNEE, INITIAL ENCOUNTER: Primary | ICD-10-CM

## 2024-09-12 DIAGNOSIS — M25.561 ACUTE PAIN OF RIGHT KNEE: ICD-10-CM

## 2024-09-12 DIAGNOSIS — R26.2 DIFFICULTY WALKING: ICD-10-CM

## 2024-09-12 PROCEDURE — 99211 OFF/OP EST MAY X REQ PHY/QHP: CPT | Performed by: ORTHOPAEDIC SURGERY

## 2024-09-12 PROCEDURE — 97112 NEUROMUSCULAR REEDUCATION: CPT | Mod: GP,CQ

## 2024-09-12 PROCEDURE — 97140 MANUAL THERAPY 1/> REGIONS: CPT | Mod: CQ,GP

## 2024-09-12 PROCEDURE — 97110 THERAPEUTIC EXERCISES: CPT | Mod: GP,CQ

## 2024-09-12 PROCEDURE — 97016 VASOPNEUMATIC DEVICE THERAPY: CPT | Mod: GP,CQ,59

## 2024-09-12 ASSESSMENT — PAIN SCALES - GENERAL: PAINLEVEL_OUTOF10: 0 - NO PAIN

## 2024-09-12 ASSESSMENT — PAIN - FUNCTIONAL ASSESSMENT: PAIN_FUNCTIONAL_ASSESSMENT: 0-10

## 2024-09-12 NOTE — PROGRESS NOTES
Physical Therapy Treatment    Patient Name: Rylee Bear  MRN: [id number]  Today's Date: 9/12/2024  Time Calculation  Start Time: 1150  Stop Time: 1245  Time Calculation (min): 55 min  PT Therapeutic Procedures Time Entry  Manual Therapy Time Entry: 15  Neuromuscular Re-Education Time Entry: 15  Therapeutic Exercise Time Entry: 15  PT Modalities Time Entry  Vasopneumatic Devices Time Entry: 10  Insurance   Visit number:  6   Approved number of visits: 40  Onset Date: 8/8/2024  Current Problem:  1. S/P reconstruction of ACL of right knee using hamstring autograft  Follow Up In Physical Therapy      2. Difficulty walking  Follow Up In Physical Therapy      3. Acute pain of right knee  Follow Up In Physical Therapy          Subjective:  I see the doctor later today.  I am continuing to not put weight on my knee and doing my HEP  Pain:  Pain Assessment: 0-10  0-10 (Numeric) Pain Score: 0 - No pain    Objective:  5 weeks post operative  Arrived in brace NWB with crutches.  AROM R knee  0-6-123  Precautions  Precautions  Precautions Comment: ACL Reconstruction with Meniscal Repair  Treatments:  Therapeutic Exercise 93073: Unit(s)-1  BFR-LE strength protocol 30/15/15/15/   mmHg  mmHg  SLR with QS-SLR  and finish with just QS  Neuromuscular Re-education 49790: Unit(s)-1  [employer]  S-emg with VMS~26 ma  SAQ with QS 2x10  Manual Therapy 07356: Unit(s) -1  Gr III patellar Mob Inf/Sup glide   STM in prone with Tiger tail to hamstring and gastroc musculature  Seated at edge of bed PROM into flexion/extension with distraction  Modalities:VND- Game ready R knee low pressure x 10 min to R knee  Assessment: Fatigued with BFR and SLR with QS.  Patient demonstrated improved extension of knee as session progressed.   Plan: Continue to work to improve quad function and progress as indicated.

## 2024-09-16 ENCOUNTER — TREATMENT (OUTPATIENT)
Dept: PHYSICAL THERAPY | Facility: CLINIC | Age: 39
End: 2024-09-16
Payer: COMMERCIAL

## 2024-09-16 DIAGNOSIS — M25.561 ACUTE PAIN OF RIGHT KNEE: ICD-10-CM

## 2024-09-16 DIAGNOSIS — R26.2 DIFFICULTY WALKING: ICD-10-CM

## 2024-09-16 DIAGNOSIS — Z98.890 S/P RECONSTRUCTION OF ACL OF RIGHT KNEE USING HAMSTRING AUTOGRAFT: ICD-10-CM

## 2024-09-16 PROCEDURE — 97016 VASOPNEUMATIC DEVICE THERAPY: CPT | Mod: GP | Performed by: PHYSICAL THERAPIST

## 2024-09-16 PROCEDURE — 97110 THERAPEUTIC EXERCISES: CPT | Mod: GP | Performed by: PHYSICAL THERAPIST

## 2024-09-16 PROCEDURE — 97140 MANUAL THERAPY 1/> REGIONS: CPT | Mod: GP | Performed by: PHYSICAL THERAPIST

## 2024-09-16 NOTE — PROGRESS NOTES
Treatment note  Physical Therapy Treatment  Patient Name:Rylee Bear  MRN:[id number]  Today's Date:2024  Referred by: Kelton Kothari  Time Calculation  Start Time: 1400  Stop Time: 1451  Time Calculation (min): 51 min    Therapy Diagnosis  1. S/P reconstruction of ACL of right knee using hamstring autograft    2. Difficulty walking    3. Acute pain of right knee      Assessment: Patient demonstrates great improvements with AROM this date achieving 134 degrees of knee flexion and lacking 3 degrees extension. NMES used in conjunction with strengthening exercises today in order to promote increased quad contractions. Education on prone extension hangs and extension props for HEP. Patient motivated to progress to WBAT next session. Briefly educated patient on single axillary crutch ambulation and gait pattern.     Plan: Progress to full ROM as tolerated, able to WBAT, gait training, weight bearing CK strengthening and balance training     Insurance  Visit number:  7   Approved number of visits: 40  Onset Date: 2024    Precautions/Fall Risk:weight bearing status: NWB for 6 weeks( then WBAT) Pacemaker no  Seizures No  Post Op Movement/Restrictions Yes  WBAT - follow     Subjective/Pain: Patient denies pain today. Some stiffness when initiating stretches. She saw MD who states she   Current Pain Level (0-10): 0    HEP Compliance: Excellent    Objective/Outcome Measures:  NWB with bilateral axillary crutches  Knee flexion: 134 deg   Knee extension: lackin deg    Treatment  Therapeutic Exercise 35 minutes  Upright Bike 7 minutes   Heel slides: 4 minutes   BFR LOP 80% 30:15:15:15 SLR - 8 min (finished in 4) (not today)  Prone Hang for knee extension: 3'   Prone HS curls: 2# x 30   LAQ + NMES to R VMO VL: Lvl 20   SLR  + NMES to R VMO VL: Lvl 20   QS + NMES to R VMO VL: Lvl 20     Manual Therapy 8 minutes -IASTM to HS and gastrocs to promote terminal knee extension, patellar mobs, extension mobs  GradeI/II/II    Modalities -Not today   Vasopneumatic Device     8 minutes -  34 deg mod compression with leg elevated for post treatment swelling       Goals:  Lower Extremity Goals  Lower Extremity Goals: By discharge, patient will:  1) Demonstrate independence with home exercise program for overall symptom management  2) Increase overall exercise tolerance without adverse reaction or increased chief complaint  3) Increase ROM of the R knee to 90 degrees (within 2-4 weeks) and WNL within 6-8 weeks in order to improve the ability to perform essential ADLs  4) Increase strength of the R LE to 4/5 (within 6 weeks) and 5/5 within 12 weeks in order to improve the ability to perform essential ADLs  5) Report decrease in pain by >= 2 points to meet MCID  6) Increase score of LEFS by > 9 points to meet the MCID  7) Ascend and descend 1 flight of stairs reciprocally and safely without an increase in symptoms  8) Ambulate x WBAT with assistive device without an increase in symptoms at 6 weeks.  9) Ambulate x community distances without assistive device and without compensation or pain within 8-10 weeks.  10) Be able to perform the ADL of squatting to floor to  object without an increase or production of symptoms

## 2024-09-19 ENCOUNTER — TREATMENT (OUTPATIENT)
Dept: PHYSICAL THERAPY | Facility: CLINIC | Age: 39
End: 2024-09-19
Payer: COMMERCIAL

## 2024-09-19 DIAGNOSIS — M25.561 ACUTE PAIN OF RIGHT KNEE: Primary | ICD-10-CM

## 2024-09-19 DIAGNOSIS — Z98.890 S/P RECONSTRUCTION OF ACL OF RIGHT KNEE USING HAMSTRING AUTOGRAFT: ICD-10-CM

## 2024-09-19 DIAGNOSIS — R26.2 DIFFICULTY WALKING: ICD-10-CM

## 2024-09-19 PROCEDURE — 97016 VASOPNEUMATIC DEVICE THERAPY: CPT | Mod: GP | Performed by: PHYSICAL THERAPIST

## 2024-09-19 PROCEDURE — 97110 THERAPEUTIC EXERCISES: CPT | Mod: GP | Performed by: PHYSICAL THERAPIST

## 2024-09-19 NOTE — PROGRESS NOTES
"Treatment note  Physical Therapy Treatment  Patient Name:Rylee Bear  MRN:[id number]  Today's Date:2024  Referred by: Kelton Kothari  Time Calculation  Start Time: 1102  Stop Time: 1157  Time Calculation (min): 55 min    Therapy Diagnosis  1. Acute pain of right knee    2. S/P reconstruction of ACL of right knee using hamstring autograft    3. Difficulty walking        Assessment: Patient able to begin weight bearing today and with exercises performed in standing. Most of session focusing on quad activation and terminal knee extension. She continues to lack some terminal knee extension but demonstrates good ability to activate quad and control movement during session. She does feel somewhat unstable due to strength deficits.     Plan: Progress with gait training, FWB, and progress with strengthening as tolerated    Insurance  Visit number:  8   Approved number of visits: 40  Onset Date: 2024    Precautions/Fall Risk:weight bearing status: NWB for 6 weeks( then WBAT) Pacemaker no  Seizures No  Post Op Movement/Restrictions Yes 9/18 may now FWB    Subjective/Pain: Patient reports some postero-lateral knee stiffness  Current Pain Level (0-10): 0    HEP Compliance: Excellent    Objective/Outcome Measures:  NWB with bilateral axillary crutches  Knee flexion: 134 deg   Knee extension: lackin deg    Treatment  Therapeutic Exercise 45 minutes  Upright Bike 6 minutes Lvl 5 Resistance  Standing TKE: 5\" x 20 (ball into wall)  Standing Lunge Knee extension mobilization with Tloops: 10\" x 10   Standing HR (toes in/out/forward) x 10 ea  Forward Step Ups 4 \" with tband behind knee: 2 x 10   TG Level 5 x 30 2 cords  TG Level 2 2 x 10 DL (use of L LE assist)  Prone Quad set: 5\" x 10   Hip Bridges: 3\" x 20  Squat sits (24\") x 10, Split 2\" x 10     Not today:  Heel slides: 4 minutes   BFR LOP 80% 30:15:15:15 SLR - 8 min (finished in 4) (not today)  Prone Hang for knee extension: 3'   Prone HS curls: 2# x 30   LAQ + " NMES to R VMO VL: Lvl 20   SLR  + NMES to R VMO VL: Lvl 20   QS + NMES to R VMO VL: Lvl 20     Manual Therapy   minutes -IASTM to HS and gastrocs to promote terminal knee extension, patellar mobs, extension mobs GradeI/II/II    Modalities -Not today   Vasopneumatic Device     10 minutes -  34 deg mod compression with leg elevated for post treatment swelling       Goals:  Lower Extremity Goals  Lower Extremity Goals: By discharge, patient will:  1) Demonstrate independence with home exercise program for overall symptom management  2) Increase overall exercise tolerance without adverse reaction or increased chief complaint  3) Increase ROM of the R knee to 90 degrees (within 2-4 weeks) and WNL within 6-8 weeks in order to improve the ability to perform essential ADLs  4) Increase strength of the R LE to 4/5 (within 6 weeks) and 5/5 within 12 weeks in order to improve the ability to perform essential ADLs  5) Report decrease in pain by >= 2 points to meet MCID  6) Increase score of LEFS by > 9 points to meet the MCID  7) Ascend and descend 1 flight of stairs reciprocally and safely without an increase in symptoms  8) Ambulate x WBAT with assistive device without an increase in symptoms at 6 weeks.  9) Ambulate x community distances without assistive device and without compensation or pain within 8-10 weeks.  10) Be able to perform the ADL of squatting to floor to  object without an increase or production of symptoms

## 2024-09-23 ENCOUNTER — TREATMENT (OUTPATIENT)
Dept: PHYSICAL THERAPY | Facility: CLINIC | Age: 39
End: 2024-09-23
Payer: COMMERCIAL

## 2024-09-23 DIAGNOSIS — Z98.890 S/P RECONSTRUCTION OF ACL OF RIGHT KNEE USING HAMSTRING AUTOGRAFT: ICD-10-CM

## 2024-09-23 DIAGNOSIS — R26.2 DIFFICULTY WALKING: ICD-10-CM

## 2024-09-23 DIAGNOSIS — M25.561 ACUTE PAIN OF RIGHT KNEE: ICD-10-CM

## 2024-09-23 PROCEDURE — 97016 VASOPNEUMATIC DEVICE THERAPY: CPT | Mod: GP | Performed by: PHYSICAL THERAPIST

## 2024-09-23 PROCEDURE — 97110 THERAPEUTIC EXERCISES: CPT | Mod: GP | Performed by: PHYSICAL THERAPIST

## 2024-09-23 NOTE — PROGRESS NOTES
"Treatment note  Physical Therapy Treatment  Patient Name:Rylee Bear  MRN:[id number]  Today's Date:2024  Referred by: Kelton Kothari  Time Calculation  Start Time: 1130  Stop Time: 1230  Time Calculation (min): 60 min    Therapy Diagnosis  1. S/P reconstruction of ACL of right knee using hamstring autograft    2. Difficulty walking    3. Acute pain of right knee        Assessment: Patient ambulating without assistive device but continues with minimal lack of terminal knee extension (lacking 2-3 degrees) She is near full active/passive flexion (135 degrees). Use of cuing on TM for heel strike and retro walking for TKE and quad control. Focus of treatment remains on continuing to build knee stability through full ROM and quad strengthening. Patient also provided with some verbal instruction for compliance with HEP throughout the day to avoid prolonged sitting/knee flexion and to promote HS mobility.     Plan: Progress with gait training, FWB, and progress with strengthening as tolerated    Insurance  Visit number:  9   Approved number of visits: 40  Onset Date: 2024    Precautions/Fall Risk:weight bearing status: NWB for 6 weeks( then WBAT) Pacemaker no  Seizures No  Post Op Movement/Restrictions Yes  may now FWB    Subjective/Pain: Patient reports she has a lot stiffness at times especially after sitting for long periods of time. Denies increase in pain/swelling since initiating weight bearing,   Current Pain Level (0-10): 0/10    HEP Compliance: Excellent    Objective/Outcome Measures:  NWB with bilateral axillary crutches  Knee flexion: 135 deg   Knee extension: lackin deg    Treatment  Therapeutic Exercise 40 minutes  TM 4 min inclue to 3%, Retro Walking 4 min .7mph  Standing TKE: 5\" x 20 (ball into wall)  Standing Lunge Knee extension mobilization with Tloops: 10\" x 10   Seated LAQ (TB green) 3 x 10  Forward/Lateral Tap Down: 4\" 2 x 10 ea  Standing HR (toes in/out/forward) x 10 ea  TG " "Level 5 x 30 2 cords  TG Level 5 3 x 10 SL 1 cord (yellow) (use of L LE assist)  Prone Quad set: 5\" x 10   Squat sits to low chair w/ TRX x 10, SL (left toe down) 2 x 10  Banded Side step@ barre x 3 (green)    Not today:  Heel slides: 4 minutes   Forward Step Ups 4 \" with tband behind knee: 2 x 10   BFR LOP 80% 30:15:15:15 SLR - 8 min (finished in 4) (not today)  Prone Hang for knee extension: 3'   Prone HS curls: 2# x 30       Manual Therapy 10 minutes -Manual  HS stretch and R knee ROM to promote terminal knee extension/flexion full ROM    Modalities  Vasopneumatic Device     10 minutes -  34 deg mod compression with leg elevated for post treatment swelling       Goals:  Lower Extremity Goals  Lower Extremity Goals: By discharge, patient will:  1) Demonstrate independence with home exercise program for overall symptom management  2) Increase overall exercise tolerance without adverse reaction or increased chief complaint  3) Increase ROM of the R knee to 90 degrees (within 2-4 weeks) and WNL within 6-8 weeks in order to improve the ability to perform essential ADLs  4) Increase strength of the R LE to 4/5 (within 6 weeks) and 5/5 within 12 weeks in order to improve the ability to perform essential ADLs  5) Report decrease in pain by >= 2 points to meet MCID  6) Increase score of LEFS by > 9 points to meet the MCID  7) Ascend and descend 1 flight of stairs reciprocally and safely without an increase in symptoms  8) Ambulate x WBAT with assistive device without an increase in symptoms at 6 weeks.  9) Ambulate x community distances without assistive device and without compensation or pain within 8-10 weeks.  10) Be able to perform the ADL of squatting to floor to  object without an increase or production of symptoms    "

## 2024-09-26 ENCOUNTER — TREATMENT (OUTPATIENT)
Dept: PHYSICAL THERAPY | Facility: CLINIC | Age: 39
End: 2024-09-26
Payer: COMMERCIAL

## 2024-09-26 DIAGNOSIS — M25.561 ACUTE PAIN OF RIGHT KNEE: ICD-10-CM

## 2024-09-26 DIAGNOSIS — Z98.890 S/P RECONSTRUCTION OF ACL OF RIGHT KNEE USING HAMSTRING AUTOGRAFT: ICD-10-CM

## 2024-09-26 DIAGNOSIS — R26.2 DIFFICULTY WALKING: ICD-10-CM

## 2024-09-26 PROCEDURE — 97110 THERAPEUTIC EXERCISES: CPT | Mod: GP | Performed by: PHYSICAL THERAPIST

## 2024-09-26 PROCEDURE — 97016 VASOPNEUMATIC DEVICE THERAPY: CPT | Mod: GP | Performed by: PHYSICAL THERAPIST

## 2024-09-26 NOTE — PROGRESS NOTES
"Treatment note  Physical Therapy Treatment  Patient Name:Rylee Bear  MRN:[id number]  Today's Date:2024  Referred by: Kelton Kothari  Time Calculation  Start Time: 1100  Stop Time: 1205  Time Calculation (min): 65 min    Therapy Diagnosis  1. S/P reconstruction of ACL of right knee using hamstring autograft    2. Difficulty walking    3. Acute pain of right knee        Assessment: Patient has been very compliant with HEP and working toward gaining terminal knee extension due to continued lack of motion. Educated patient on focus of extension stretches to perform throughout the day as she sits a lot. She presents with impriving quad/HS strength with noted fatigue, denies pain, eccentric control improving. Continue to progress with LE strengthening on the R    Plan: Initiate Dry needling to HS to promote knee extension, initiate SLB static and dynamic    Insurance  Visit number:  10   Approved number of visits: 40  Onset Date: 2024    Precautions/Fall Risk:weight bearing status: NWB for 6 weeks( then WBAT) Pacemaker no  Seizures No  Post Op Movement/Restrictions Yes 9/18 may now FWB    Subjective/Pain: Patient reports she is feeling more stable and stronger but continues to notice the restriction into extension. She states stiffness upon initiation of movement has also lessened.   Current Pain Level (0-10): 0/10    HEP Compliance: Excellent    Objective/Outcome Measures:  FWB no assistive device  Knee flexion: 135 deg   Knee extension: lackin deg  Decreased Eccentric control with forward/lateral lowering  Lack of terminal knee extension during heel strike and weight acceptance phase  R Knee MMT:  Flexion: 4+/5  Extension: 4/5     Treatment  Therapeutic Exercise 55 minutes  Upright Bike: 5 min  Standing Forward bend (ankle PF/DF) x 10   Lateral Tap Down: 4\" 2 x 10 ea (second set with TKE green TB)  TG Level 7 x 30 2 cords  TG Level 7 3 x 10 SL (use of L LE assist)  Hip Bridge (6\" stool heel dig) x " "15 / x 15 + ADD ball squeeze  Forward Step Ups 4 \" with tband behind knee: 2 x 10   RDL DL 2 x 10 10# / B-Stance 2x10 R/L 10#  TRX squats/SL squats/ R LE reverse lunge (partial range) x 10 ea     Not today:  Heel slides: 4 minutes   Forward Step Ups 4 \" with tband behind knee: 2 x 10   BFR LOP 80% 30:15:15:15 SLR - 8 min (finished in 4) (not today)  Prone Hang for knee extension: 3'   Prone HS curls: 2# x 30       Manual Therapy   minutes -Manual  HS stretch and R knee ROM to promote terminal knee extension/flexion full ROM    Modalities  Vasopneumatic Device     10 minutes -  34 deg mod compression with leg elevated for post treatment swelling       Goals:  Lower Extremity Goals  Lower Extremity Goals: By discharge, patient will:  1) Demonstrate independence with home exercise program for overall symptom management  2) Increase overall exercise tolerance without adverse reaction or increased chief complaint  3) Increase ROM of the R knee to 90 degrees (within 2-4 weeks) and WNL within 6-8 weeks in order to improve the ability to perform essential ADLs  4) Increase strength of the R LE to 4/5 (within 6 weeks) and 5/5 within 12 weeks in order to improve the ability to perform essential ADLs  5) Report decrease in pain by >= 2 points to meet MCID  6) Increase score of LEFS by > 9 points to meet the MCID  7) Ascend and descend 1 flight of stairs reciprocally and safely without an increase in symptoms  8) Ambulate x WBAT with assistive device without an increase in symptoms at 6 weeks.  9) Ambulate x community distances without assistive device and without compensation or pain within 8-10 weeks.  10) Be able to perform the ADL of squatting to floor to  object without an increase or production of symptoms    "

## 2024-09-30 ENCOUNTER — TREATMENT (OUTPATIENT)
Dept: PHYSICAL THERAPY | Facility: CLINIC | Age: 39
End: 2024-09-30
Payer: COMMERCIAL

## 2024-09-30 DIAGNOSIS — Z98.890 S/P RECONSTRUCTION OF ACL OF RIGHT KNEE USING HAMSTRING AUTOGRAFT: ICD-10-CM

## 2024-09-30 DIAGNOSIS — R26.2 DIFFICULTY WALKING: ICD-10-CM

## 2024-09-30 DIAGNOSIS — M25.561 ACUTE PAIN OF RIGHT KNEE: ICD-10-CM

## 2024-09-30 PROCEDURE — 97016 VASOPNEUMATIC DEVICE THERAPY: CPT | Mod: GP | Performed by: PHYSICAL THERAPIST

## 2024-09-30 PROCEDURE — 97140 MANUAL THERAPY 1/> REGIONS: CPT | Mod: GP | Performed by: PHYSICAL THERAPIST

## 2024-09-30 PROCEDURE — 97110 THERAPEUTIC EXERCISES: CPT | Mod: GP | Performed by: PHYSICAL THERAPIST

## 2024-09-30 NOTE — PROGRESS NOTES
Treatment note  Physical Therapy Treatment  Patient Name:Rylee Bear  MRN:[id number]  Today's Date:9/30/2024  Referred by: Kelton Kothari  Time Calculation  Start Time: 1045  Stop Time: 1200  Time Calculation (min): 75 min    Therapy Diagnosis  1. S/P reconstruction of ACL of right knee using hamstring autograft    2. Difficulty walking    3. Acute pain of right knee        Assessment: Treatment today with greater focus on manual techniques to promote knee extension as patient continues to lack last few degrees. Myofascial trigger points revealed in the posterior chain HS/gastrocs muscle(s) by dry needle technique with noted needle fibrillation, local twitch response, reproduction of symptoms including but not limited to aching, burning and electricity. Noted are atypical tissue morphology characteristics of abnormal density, palpable margins, contracted/fibrotic muscle and fascial tissue with resistance to penetration. These characteristics reflect abnormal tissue function, innervation and nervous system communication. Cupping techniques to HS and Gastrocs along with contract relax and passive stretching ROM to R knee today. Patient achieving full knee flexion. All exercises targeted to activate quadriceps muscle strengthening, balance, and proprioception.       Plan: Continue with manual therapy techniques to promote terminal knee extension      Insurance  Visit number:  11   Approved number of visits: 40  Onset Date: 8/8/2024    Precautions/Fall Risk:weight bearing status: FWB Pacemaker no  Seizures No  Post Op Movement/Restrictions Yes 9/18 may now FWB    Subjective/Pain: Patient reports she was on her feet a lot this weekend and her knee is feeling a littler bit stiff today. She enters clinic without brace stating that it feels like it is hindering her mobility .  Current Pain Level (0-10): 0/10    HEP Compliance: Excellent    Objective/Outcome Measures:  FWB no assistive device  Knee flexion: 135 deg   Knee  "extension: lackin deg  Decreased Eccentric control with forward/lateral lowering  Lack of terminal knee extension during heel strike and weight acceptance phase  R Knee MMT:  Flexion: 4+/5  Extension: 4/5       Treatment  Therapeutic Exercise 40 minutes  Upright Bike: 5 min  Standing Forward bend (ankle PF/DF) x 10   Lateral Tap Down: 4\" 2 x 10 ea (second set with TKE green TB)  TG Level 7 x 30 2 cords  TG Level 7 3 x 10 SL (use of L LE assist)  Hip Bridge (6\" stool heel dig) x 15 / x 15 + ADD ball squeeze  Forward Step Ups 4 \" with tband behind knee: 2 x 10   RDL DL 2 x 10 10# / B-Stance 2x10 R/L 10#  TRX squats/SL squats/ R LE reverse lunge (partial range) x 10 ea     Not today:  Heel slides: 4 minutes   Forward Step Ups 4 \" with tband behind knee: 2 x 10   BFR LOP 80% 30:15:15:15 SLR - 8 min (finished in 4) (not today)  Prone Hang for knee extension: 3'   Prone HS curls: 2# x 30       Manual Therapy 25 minutes -Manual  HS stretch and R knee ROM to promote terminal knee extension/flexion full ROM, contract relax for HS stretch, knee extension mobilization  Trigger point needling was performed to the following muscles (name muscles). All trigger/tender points were marked and noted. Patient was prepped with 70% Isopropyl alcohol to the site(s). Sterile, single use, solid filament needles were inserted at various depths and angles to release tight tissue, improve microcirculation and remove neuro-noxious chemicals via a myofascial twitch response.    Modalities  Vasopneumatic Device     10 minutes -  34 deg mod compression with leg elevated for post treatment swelling     Goals:  Lower Extremity Goals  Lower Extremity Goals: By discharge, patient will:  1) Demonstrate independence with home exercise program for overall symptom management  2) Increase overall exercise tolerance without adverse reaction or increased chief complaint  3) Increase ROM of the R knee to 90 degrees (within 2-4 weeks) and WNL within 6-8 " weeks in order to improve the ability to perform essential ADLs  4) Increase strength of the R LE to 4/5 (within 6 weeks) and 5/5 within 12 weeks in order to improve the ability to perform essential ADLs  5) Report decrease in pain by >= 2 points to meet MCID  6) Increase score of LEFS by > 9 points to meet the MCID  7) Ascend and descend 1 flight of stairs reciprocally and safely without an increase in symptoms  8) Ambulate x WBAT with assistive device without an increase in symptoms at 6 weeks.  9) Ambulate x community distances without assistive device and without compensation or pain within 8-10 weeks.  10) Be able to perform the ADL of squatting to floor to  object without an increase or production of symptoms

## 2024-10-03 ENCOUNTER — TREATMENT (OUTPATIENT)
Dept: PHYSICAL THERAPY | Facility: CLINIC | Age: 39
End: 2024-10-03
Payer: COMMERCIAL

## 2024-10-03 DIAGNOSIS — Z98.890 S/P RECONSTRUCTION OF ACL OF RIGHT KNEE USING HAMSTRING AUTOGRAFT: ICD-10-CM

## 2024-10-03 DIAGNOSIS — M25.561 ACUTE PAIN OF RIGHT KNEE: ICD-10-CM

## 2024-10-03 DIAGNOSIS — R26.2 DIFFICULTY WALKING: ICD-10-CM

## 2024-10-03 PROCEDURE — 97110 THERAPEUTIC EXERCISES: CPT | Mod: GP | Performed by: PHYSICAL THERAPIST

## 2024-10-03 PROCEDURE — 97016 VASOPNEUMATIC DEVICE THERAPY: CPT | Mod: GP | Performed by: PHYSICAL THERAPIST

## 2024-10-03 NOTE — PROGRESS NOTES
"Treatment note  Physical Therapy Treatment  Patient Name:Rylee Bear  MRN:[id number]  Today's Date:10/3/2024  Referred by: Kelton Kothari  Time Calculation  Start Time: 1015  Stop Time: 1120  Time Calculation (min): 65 min    Therapy Diagnosis  1. S/P reconstruction of ACL of right knee using hamstring autograft    2. Difficulty walking    3. Acute pain of right knee        Assessment: Treatment today with greater focus on quad activation and use of BFR for increasing muscle mass and strength. Patient challenged with this treatment technique but demonstrates good form. Continue to focus on SL strength, balance, and regaining full ROM. Educated patient on 3x/week strengthening and daily mobility.     Plan: Continue with manual therapy techniques to promote terminal knee extension    Insurance  Visit number:  12   Approved number of visits: 40  Onset Date: 2024    Precautions/Fall Risk:weight bearing status: FWB Pacemaker no  Seizures No  Post Op Movement/Restrictions Yes 9/18 may now FWB    Subjective/Pain: Patient reports she still has the most difficulty with stairs. She has no pain and is feeling less stiffness overall. She feels her gait is not impacted by her minor extension limitation.   Current Pain Level (0-10): 0/10    HEP Compliance: Excellent    Objective/Outcome Measures:  FWB no assistive device  Knee flexion: 135 deg   Knee extension: lackin deg  Decreased Eccentric control with forward/lateral lowering  Lack of terminal knee extension during heel strike and weight acceptance phase  R Knee MMT:  Flexion: 4+/5  Extension: 4/5     Treatment  Therapeutic Exercise 55 minutes  Retro Walking: Incline 7% Speed 1.0 mph   Cable column TKE: 15# x 10 / 20# x 20  BFR LOP LAQ: 5# 80% 30:15:15:15 SLR - 8 min   TG Level 7 x 30 2 cords DL +Pulse  TG Level 7 3 x 10 SL (use of L LE assist)  Forward Step Ups 8 \" with tband behind knee: 2 x 10   Standing Tband clamshells: yellow 2 x 10   Inchworm (Tband) @ barre " "x 4 Green (band around ankles)  Lateral/Anterior Tap Down: 4\" 2 x 10 ea     Not today:  RDL DL 2 x 10 10# / B-Stance 2x10 R/L 10#  TRX squats/SL squats/ R LE reverse lunge (partial range) x 10 ea   Standing Forward bend (ankle PF/DF) x 10   Lateral Tap Down: 4\" 2 x 10 ea (second set with TKE green TB)  Prone Hang for knee extension: 3'   Prone HS curls: 2# x 30 Standing Forward bend (ankle PF/DF) x 10   Lateral Tap Down: 4\" 2 x 10 ea (second set with TKE green TB)      Manual Therapy   minutes -Manual  Not today  HS stretch and R knee ROM to promote terminal knee extension/flexion full ROM, contract relax for HS stretch, knee extension mobilization  Trigger point needling was performed to the following muscles (name muscles). All trigger/tender points were marked and noted. Patient was prepped with 70% Isopropyl alcohol to the site(s). Sterile, single use, solid filament needles were inserted at various depths and angles to release tight tissue, improve microcirculation and remove neuro-noxious chemicals via a myofascial twitch response.    Modalities  Vasopneumatic Device     10 minutes -  34 deg mod compression with leg elevated for post treatment swelling     Goals:  Lower Extremity Goals  Lower Extremity Goals: By discharge, patient will:  1) Demonstrate independence with home exercise program for overall symptom management  2) Increase overall exercise tolerance without adverse reaction or increased chief complaint  3) Increase ROM of the R knee to 90 degrees (within 2-4 weeks) and WNL within 6-8 weeks in order to improve the ability to perform essential ADLs  4) Increase strength of the R LE to 4/5 (within 6 weeks) and 5/5 within 12 weeks in order to improve the ability to perform essential ADLs  5) Report decrease in pain by >= 2 points to meet MCID  6) Increase score of LEFS by > 9 points to meet the MCID  7) Ascend and descend 1 flight of stairs reciprocally and safely without an increase in symptoms  8) " Ambulate x WBAT with assistive device without an increase in symptoms at 6 weeks.  9) Ambulate x community distances without assistive device and without compensation or pain within 8-10 weeks.  10) Be able to perform the ADL of squatting to floor to  object without an increase or production of symptoms

## 2024-10-10 ENCOUNTER — APPOINTMENT (OUTPATIENT)
Dept: ORTHOPEDIC SURGERY | Facility: CLINIC | Age: 39
End: 2024-10-10
Payer: COMMERCIAL

## 2024-10-17 ENCOUNTER — TREATMENT (OUTPATIENT)
Dept: PHYSICAL THERAPY | Facility: CLINIC | Age: 39
End: 2024-10-17
Payer: COMMERCIAL

## 2024-10-17 DIAGNOSIS — R26.2 DIFFICULTY WALKING: ICD-10-CM

## 2024-10-17 DIAGNOSIS — Z98.890 S/P RECONSTRUCTION OF ACL OF RIGHT KNEE USING HAMSTRING AUTOGRAFT: ICD-10-CM

## 2024-10-17 DIAGNOSIS — M25.561 ACUTE PAIN OF RIGHT KNEE: ICD-10-CM

## 2024-10-17 PROCEDURE — 97110 THERAPEUTIC EXERCISES: CPT | Mod: GP | Performed by: PHYSICAL THERAPIST

## 2024-10-17 NOTE — PROGRESS NOTES
"Treatment note  Physical Therapy Treatment  Patient Name:Rylee Bear  MRN:[id number]  Today's Date:10/17/2024  Referred by: Kelton Kothari  Time Calculation  Start Time: 1430  Stop Time: 1515  Time Calculation (min): 45 min    Therapy Diagnosis  1. S/P reconstruction of ACL of right knee using hamstring autograft    2. Difficulty walking    3. Acute pain of right knee        Assessment: Treatment today with greater focus on quad activation and initiation of plyometrics for return to running.  Patient continues challenged with this treatments eccentric control exercises . Continue to focus on SL strength, balance, and regaining full ROM. Continue to progress plyometrics for return to running,     Plan: Continue with manual therapy techniques to promote terminal knee extension, Dry needling, passive stretching, progress plyometrics    Insurance  Visit number:  13   Approved number of visits: 40  Onset Date: 2024    Precautions/Fall Risk:weight bearing status: FWB Pacemaker no  Seizures No  Post Op Movement/Restrictions Yes 9/18 may now FWB    Subjective/Pain: Patient reports she has been moving more and doing workouts at home. She still notices she isn't getting full extension. Prolonged sitting is when she notices this most. Overall no pain but some difficulty descending stairs.  Current Pain Level (0-10): 0/10    HEP Compliance: Excellent    Objective/Outcome Measures:  FWB no assistive device  Knee flexion: 135 deg   Knee extension: lackin deg (able to get full PROM extension)  Decreased Eccentric control with forward/lateral lowering  Lack of terminal knee extension during heel strike and weight acceptance phase  R Knee MMT:  Flexion: 4+/5  Extension: 4/5     Treatment  Therapeutic Exercise 45 minutes  Walking: Incline 7% Speed 1.0 mph   Cable column TKE: 15# x 10 / 20# x 20  Reverse Lunge of 4\" step 2 x10   Lateral Lunges: 2 x10 R/L  TG Level 1 x 20  DL jumps (1 cord)  TG Level 1  x 10 DL jump / SL " "landing  TG level 1 x 10 Alternating R/L Jump  TKE squats (bilateral) 2 x 10   SL cone taps: x 10   HS stretch (manual knee extension) 5 minutes    Not today:  RDL DL 2 x 10 10# / B-Stance 2x10 R/L 10#  TRX squats/SL squats/ R LE reverse lunge (partial range) x 10 ea   Standing Forward bend (ankle PF/DF) x 10   Lateral Tap Down: 4\" 2 x 10 ea (second set with TKE green TB)  Prone Hang for knee extension: 3'   Prone HS curls: 2# x 30 Standing Forward bend (ankle PF/DF) x 10   Lateral Tap Down: 4\" 2 x 10 ea (second set with TKE green TB)  Forward Step Ups 8 \" with tband behind knee: 2 x 10   Standing Tband clamshells: yellow 2 x 10   Inchworm (Tband) @ barre x 4 Green (band around ankles)  Lateral/Anterior Tap Down: 4\" 2 x 10 ea     Manual Therapy   minutes -Manual  Not today  HS stretch and R knee ROM to promote terminal knee extension/flexion full ROM, contract relax for HS stretch, knee extension mobilization  Trigger point needling was performed to the following muscles (name muscles). All trigger/tender points were marked and noted. Patient was prepped with 70% Isopropyl alcohol to the site(s). Sterile, single use, solid filament needles were inserted at various depths and angles to release tight tissue, improve microcirculation and remove neuro-noxious chemicals via a myofascial twitch response.    Modalities  Vasopneumatic Device       minutes -  34 deg mod compression with leg elevated for post treatment swelling     Goals:  Lower Extremity Goals  Lower Extremity Goals: By discharge, patient will:  1) Demonstrate independence with home exercise program for overall symptom management  2) Increase overall exercise tolerance without adverse reaction or increased chief complaint  3) Increase ROM of the R knee to 90 degrees (within 2-4 weeks) and WNL within 6-8 weeks in order to improve the ability to perform essential ADLs  4) Increase strength of the R LE to 4/5 (within 6 weeks) and 5/5 within 12 weeks in order to " improve the ability to perform essential ADLs  5) Report decrease in pain by >= 2 points to meet MCID  6) Increase score of LEFS by > 9 points to meet the MCID  7) Ascend and descend 1 flight of stairs reciprocally and safely without an increase in symptoms  8) Ambulate x WBAT with assistive device without an increase in symptoms at 6 weeks.  9) Ambulate x community distances without assistive device and without compensation or pain within 8-10 weeks.  10) Be able to perform the ADL of squatting to floor to  object without an increase or production of symptoms

## 2024-10-21 ENCOUNTER — TREATMENT (OUTPATIENT)
Dept: PHYSICAL THERAPY | Facility: CLINIC | Age: 39
End: 2024-10-21
Payer: COMMERCIAL

## 2024-10-21 DIAGNOSIS — R26.2 DIFFICULTY WALKING: ICD-10-CM

## 2024-10-21 DIAGNOSIS — Z98.890 S/P RECONSTRUCTION OF ACL OF RIGHT KNEE USING HAMSTRING AUTOGRAFT: ICD-10-CM

## 2024-10-21 DIAGNOSIS — M25.561 ACUTE PAIN OF RIGHT KNEE: ICD-10-CM

## 2024-10-21 PROCEDURE — 97140 MANUAL THERAPY 1/> REGIONS: CPT | Mod: GP | Performed by: PHYSICAL THERAPIST

## 2024-10-21 PROCEDURE — 97016 VASOPNEUMATIC DEVICE THERAPY: CPT | Mod: GP | Performed by: PHYSICAL THERAPIST

## 2024-10-21 NOTE — PROGRESS NOTES
Treatment note  Physical Therapy Treatment  Patient Name:Rylee Bear  MRN:[id number]  Today's Date:10/21/2024  Referred by: Kelton Kothari  Time Calculation  Start Time: 1130  Stop Time: 1220  Time Calculation (min): 50 min    Therapy Diagnosis  1. S/P reconstruction of ACL of right knee using hamstring autograft    2. Difficulty walking    3. Acute pain of right knee        Assessment: Treatment today with focus on manual techniques to address lack of mobility and knee soreness. Myofascial trigger points revealed in the biceps femoris and semitendinosus muscle(s) by dry needle technique with noted needle fibrillation, local twitch response, reproduction of symptoms including but not limited to aching, burning and electricity. Noted are atypical tissue morphology characteristics of abnormal density, palpable margins, contracted/fibrotic muscle and fascial tissue with resistance to penetration. These characteristics reflect abnormal tissue function, innervation and nervous system communication. Graston technique and manual stretching following dry needling to HS/gastrocs to promote knee extension. Vasocompression post session for muscle soreness and swelling tolerated well by patient       Plan: Resume regular strength progression next session, continue manual therapies as needed to promote knee extension    Insurance  Visit number:  14   Approved number of visits: 40  Onset Date: 2024    Precautions/Fall Risk:weight bearing status: FWB Pacemaker no  Seizures No  Post Op Movement/Restrictions Yes 9/18 may now FWB    Subjective/Pain: Patient reports she is a little sore she feels like she twisted her knee a little coming down the stairs today. She was on her feet a lot over the weekend and worked out this morning so she is just sore at arrival.  Current Pain Level (0-10): 3/10    HEP Compliance: Excellent    Objective/Outcome Measures:  FWB no assistive device  Knee flexion: 135 deg   Knee extension: lackin  "deg (able to get full PROM extension)  Decreased Eccentric control with forward/lateral lowering  Lack of terminal knee extension during heel strike and weight acceptance phase  R Knee MMT:  Flexion: 4+/5  Extension: 4/5     Treatment  Therapeutic Exercise   minutes -NOT today  Walking: Incline 7% Speed 1.0 mph   Cable column TKE: 15# x 10 / 20# x 20  Reverse Lunge of 4\" step 2 x10   Lateral Lunges: 2 x10 R/L  TG Level 1 x 20  DL jumps (1 cord)  TG Level 1  x 10 DL jump / SL landing  TG level 1 x 10 Alternating R/L Jump  TKE squats (bilateral) 2 x 10   SL cone taps: x 10   HS stretch (manual knee extension) 5 minutes    Not today:  RDL DL 2 x 10 10# / B-Stance 2x10 R/L 10#  TRX squats/SL squats/ R LE reverse lunge (partial range) x 10 ea   Standing Forward bend (ankle PF/DF) x 10   Lateral Tap Down: 4\" 2 x 10 ea (second set with TKE green TB)  Prone Hang for knee extension: 3'   Prone HS curls: 2# x 30 Standing Forward bend (ankle PF/DF) x 10   Lateral Tap Down: 4\" 2 x 10 ea (second set with TKE green TB)  Forward Step Ups 8 \" with tband behind knee: 2 x 10   Standing Tband clamshells: yellow 2 x 10   Inchworm (Tband) @ barre x 4 Green (band around ankles)  Lateral/Anterior Tap Down: 4\" 2 x 10 ea     Manual Therapy 40 minutes -Manual    HS stretch and R knee ROM to promote terminal knee extension/flexion full ROM, contract relax for HS stretch, knee extension mobilization, GT to hamstrings and gastrocs  Trigger point needling was performed to the following muscles (name muscles). All trigger/tender points were marked and noted. Patient was prepped with 70% Isopropyl alcohol to the site(s). Sterile, single use, solid filament needles were inserted at various depths and angles to release tight tissue, improve microcirculation and remove neuro-noxious chemicals via a myofascial twitch response.    Modalities  Vasopneumatic Device     10 minutes -  34 deg mod compression with leg elevated for post treatment swelling "     Goals:  Lower Extremity Goals  Lower Extremity Goals: By discharge, patient will:  1) Demonstrate independence with home exercise program for overall symptom management  2) Increase overall exercise tolerance without adverse reaction or increased chief complaint  3) Increase ROM of the R knee to 90 degrees (within 2-4 weeks) and WNL within 6-8 weeks in order to improve the ability to perform essential ADLs  4) Increase strength of the R LE to 4/5 (within 6 weeks) and 5/5 within 12 weeks in order to improve the ability to perform essential ADLs  5) Report decrease in pain by >= 2 points to meet MCID  6) Increase score of LEFS by > 9 points to meet the MCID  7) Ascend and descend 1 flight of stairs reciprocally and safely without an increase in symptoms  8) Ambulate x WBAT with assistive device without an increase in symptoms at 6 weeks.  9) Ambulate x community distances without assistive device and without compensation or pain within 8-10 weeks.  10) Be able to perform the ADL of squatting to floor to  object without an increase or production of symptoms

## 2024-10-24 ENCOUNTER — TREATMENT (OUTPATIENT)
Dept: PHYSICAL THERAPY | Facility: CLINIC | Age: 39
End: 2024-10-24
Payer: COMMERCIAL

## 2024-10-24 DIAGNOSIS — R26.2 DIFFICULTY WALKING: ICD-10-CM

## 2024-10-24 DIAGNOSIS — Z98.890 S/P RECONSTRUCTION OF ACL OF RIGHT KNEE USING HAMSTRING AUTOGRAFT: ICD-10-CM

## 2024-10-24 DIAGNOSIS — M25.561 ACUTE PAIN OF RIGHT KNEE: ICD-10-CM

## 2024-10-24 PROCEDURE — 97140 MANUAL THERAPY 1/> REGIONS: CPT | Mod: GP | Performed by: PHYSICAL THERAPIST

## 2024-10-24 PROCEDURE — 97016 VASOPNEUMATIC DEVICE THERAPY: CPT | Mod: GP | Performed by: PHYSICAL THERAPIST

## 2024-10-24 PROCEDURE — 97110 THERAPEUTIC EXERCISES: CPT | Mod: GP | Performed by: PHYSICAL THERAPIST

## 2024-10-24 NOTE — PROGRESS NOTES
"Treatment note  Physical Therapy Treatment  Patient Name:Rylee Bear  MRN:[id number]  Today's Date:10/24/2024  Referred by: Kelton Kothari  Time Calculation  Start Time: 09  Stop Time: 955  Time Calculation (min): 55 min    Therapy Diagnosis  1. S/P reconstruction of ACL of right knee using hamstring autograft    2. Difficulty walking    3. Acute pain of right knee        Assessment: Treatment today focusing on continuing to strengthen quads, promoting knee extension and initiating standing plyometrics. HS mobility continues to be limited but active mobility is beneficial. Cuing and visual demonstration for plyometric hopping and bounding today with some L sided compensation. Able to improve landing control and reciprocity between both sides.     Plan: Continue to progress with mobility, plyometrics,   Initiate running on [employer] next session  B-stance RDL with trunk rotation    Insurance  Visit number:  15   Approved number of visits: 40  Onset Date: 2024    Precautions/Fall Risk:weight bearing status: FWB Pacemaker no  Seizures No  Post Op Movement/Restrictions Yes 9/18 may now FWB    Subjective/Pain: Patient reports she is feeling weak this week but not as sore.   Current Pain Level (0-10): 3/10    HEP Compliance: Excellent    Objective/Outcome Measures:  FWB no assistive device  Knee flexion: 135 deg   Knee extension: lackin deg (able to get full PROM extension)  Decreased Eccentric control with forward/lateral lowering  Lack of terminal knee extension during heel strike and weight acceptance phase  R Knee MMT:  Flexion: 4+/5  Extension: 4/5     Treatment  Therapeutic Exercise 35 minutes -NOT today  Walking: Incline 7% Speed 1.0 mph   Plank TKE Blue TB: 2x 10   Forward Flexion for HS s: PF/DF x 10 ea  Lunge TKE (R foot in front) Blue 2 x 10   Wobble Board F/L 30\" x 5 ea (yellow tloop)  SLS + ball toss x 20   SLS on Airex: 20\" x 5   TG Level 1 x 20  DL jumps (2 cord)  TG Level 1  x 10 DL jump / SL " "landing  Step Tap Backs x 10   4\" inch bilateral bounding: x 10   Vertical jump: yellow tloop x 10     Not today:  RDL DL 2 x 10 10# / B-Stance 2x10 R/L 10#  TRX squats/SL squats/ R LE reverse lunge (partial range) x 10 ea   Standing Forward bend (ankle PF/DF) x 10   Prone Hang for knee extension: 3'   Prone HS curls: 2# x 30 Standing Forward bend (ankle PF/DF) x 10   Lateral Tap Down: 4\" 2 x 10 ea (second set with TKE green TB)  Forward Step Ups 8 \" with tband behind knee: 2 x 10   Standing Tband clamshells: yellow 2 x 10   Inchworm (Tband) @ barre x 4 Green (band around ankles)  Lateral/Anterior Tap Down: 4\" 2 x 10 ea   TKE squats (bilateral) 2 x 10   SL cone taps: x 10   HS stretch (manual knee extension) 5 minutes    Manual Therapy 10 minutes -Manual    HS stretch and R knee extension Long sit with Manual assisted strap calf stretch on 1/2 foam roll      Modalities  Vasopneumatic Device     10 minutes -  34 deg mod compression with leg elevated for post treatment swelling     Goals:  Lower Extremity Goals  Lower Extremity Goals: By discharge, patient will:  1) Demonstrate independence with home exercise program for overall symptom management  2) Increase overall exercise tolerance without adverse reaction or increased chief complaint  3) Increase ROM of the R knee to 90 degrees (within 2-4 weeks) and WNL within 6-8 weeks in order to improve the ability to perform essential ADLs  4) Increase strength of the R LE to 4/5 (within 6 weeks) and 5/5 within 12 weeks in order to improve the ability to perform essential ADLs  5) Report decrease in pain by >= 2 points to meet MCID  6) Increase score of LEFS by > 9 points to meet the MCID  7) Ascend and descend 1 flight of stairs reciprocally and safely without an increase in symptoms  8) Ambulate x WBAT with assistive device without an increase in symptoms at 6 weeks.  9) Ambulate x community distances without assistive device and without compensation or pain within 8-10 " weeks.  10) Be able to perform the ADL of squatting to floor to  object without an increase or production of symptoms

## 2024-10-28 ENCOUNTER — TREATMENT (OUTPATIENT)
Dept: PHYSICAL THERAPY | Facility: CLINIC | Age: 39
End: 2024-10-28
Payer: COMMERCIAL

## 2024-10-28 DIAGNOSIS — Z98.890 S/P RECONSTRUCTION OF ACL OF RIGHT KNEE USING HAMSTRING AUTOGRAFT: ICD-10-CM

## 2024-10-28 DIAGNOSIS — M25.561 ACUTE PAIN OF RIGHT KNEE: ICD-10-CM

## 2024-10-28 DIAGNOSIS — R26.2 DIFFICULTY WALKING: ICD-10-CM

## 2024-10-28 PROCEDURE — 97016 VASOPNEUMATIC DEVICE THERAPY: CPT | Mod: GP | Performed by: PHYSICAL THERAPIST

## 2024-10-28 PROCEDURE — 97110 THERAPEUTIC EXERCISES: CPT | Mod: GP | Performed by: PHYSICAL THERAPIST

## 2024-10-31 ENCOUNTER — TREATMENT (OUTPATIENT)
Dept: PHYSICAL THERAPY | Facility: CLINIC | Age: 39
End: 2024-10-31
Payer: COMMERCIAL

## 2024-10-31 ENCOUNTER — HOSPITAL ENCOUNTER (OUTPATIENT)
Dept: RADIOLOGY | Facility: CLINIC | Age: 39
Discharge: HOME | End: 2024-10-31
Payer: COMMERCIAL

## 2024-10-31 ENCOUNTER — OFFICE VISIT (OUTPATIENT)
Dept: ORTHOPEDIC SURGERY | Facility: CLINIC | Age: 39
End: 2024-10-31
Payer: COMMERCIAL

## 2024-10-31 DIAGNOSIS — Z98.890 S/P RECONSTRUCTION OF ACL OF RIGHT KNEE USING HAMSTRING AUTOGRAFT: ICD-10-CM

## 2024-10-31 DIAGNOSIS — R26.2 DIFFICULTY WALKING: ICD-10-CM

## 2024-10-31 DIAGNOSIS — M25.561 ACUTE PAIN OF RIGHT KNEE: ICD-10-CM

## 2024-10-31 DIAGNOSIS — S83.511A RUPTURE OF ANTERIOR CRUCIATE LIGAMENT OF RIGHT KNEE, INITIAL ENCOUNTER: ICD-10-CM

## 2024-10-31 PROCEDURE — 73560 X-RAY EXAM OF KNEE 1 OR 2: CPT | Mod: RT

## 2024-10-31 PROCEDURE — 99211 OFF/OP EST MAY X REQ PHY/QHP: CPT | Performed by: ORTHOPAEDIC SURGERY

## 2024-10-31 PROCEDURE — 97110 THERAPEUTIC EXERCISES: CPT | Mod: GP | Performed by: PHYSICAL THERAPIST

## 2024-10-31 PROCEDURE — 73560 X-RAY EXAM OF KNEE 1 OR 2: CPT | Mod: RIGHT SIDE | Performed by: RADIOLOGY

## 2024-11-04 ENCOUNTER — TREATMENT (OUTPATIENT)
Dept: PHYSICAL THERAPY | Facility: CLINIC | Age: 39
End: 2024-11-04
Payer: COMMERCIAL

## 2024-11-04 DIAGNOSIS — Z98.890 S/P RECONSTRUCTION OF ACL OF RIGHT KNEE USING HAMSTRING AUTOGRAFT: ICD-10-CM

## 2024-11-04 DIAGNOSIS — M25.561 ACUTE PAIN OF RIGHT KNEE: ICD-10-CM

## 2024-11-04 DIAGNOSIS — R26.2 DIFFICULTY WALKING: ICD-10-CM

## 2024-11-04 PROCEDURE — 97016 VASOPNEUMATIC DEVICE THERAPY: CPT | Mod: GP | Performed by: PHYSICAL THERAPIST

## 2024-11-04 PROCEDURE — 97110 THERAPEUTIC EXERCISES: CPT | Mod: GP | Performed by: PHYSICAL THERAPIST

## 2024-11-04 NOTE — PROGRESS NOTES
Treatment note  Physical Therapy Treatment  Patient Name:Rylee Bear  MRN:[id number]  Today's Date:2024  Referred by: Kelton Kothari  Time Calculation  Start Time: 1046  Stop Time: 1146  Time Calculation (min): 60 min    Therapy Diagnosis  1. S/P reconstruction of ACL of right knee using hamstring autograft    2. Difficulty walking    3. Acute pain of right knee        Assessment: Treatment continues today on alter-g treadmill for initiation of running increased to 60% BW today and able to progress to 5.0 mph without compensation with goal to progress to full BW and provided RTS form to start initiating SL plyometric strengthening. Continued focus on quad strength and plyometric progression with goal to not compensate. She is able to correct any weight shift away from involved LE with cuing.  Progressing with HS mobility nearing end range of extension. Fatigue in L and R LE throughout session.     Plan: Continue to progress with mobility, plyometrics,   Progress with running on Alter-g next session and jumping activities   B-stance RDL with trunk rotation    Insurance  Visit number:  18   Approved number of visits: 40  Onset Date: 2024    Precautions/Fall Risk:weight bearing status: FWB Pacemaker no  Seizures No  Post Op Movement/Restrictions Yes 9/18 may now FWB    Subjective/Pain: Patient reports she walked a lot over the weekend and has been feeling good. She had her follow up with MD with no concerns.   Current Pain Level (0-10): 0/10    HEP Compliance: Excellent    Objective/Outcome Measures:  Knee flexion: 135 deg   Knee extension: lackin deg (able to get full PROM extension)  Decreased Eccentric control with forward/lateral lowering  Lack of terminal knee extension during heel strike and weight acceptance phase  R Knee MMT:  Flexion: 4+/5  Extension: 4/5     Treatment  Therapeutic Exercise 45 minutes -NOT today  Alter-G TM walk/run 60% BW up to 5.0 mph for 10 minutes  SL Squat to chair (+  "Airex): 2 x 10   Forward Broad Jump: x 1  Forward Flexion for HS s: PF/DF x 10 ea  Bilateral Hops: x 10   Reverse Lunges (on slider) front foot (R) elevated with Green TKE: 2 x 10   Side step - Squat - side step Vertical Jump: 2 x 10 R/L    Not today:  TG Level 6 4 cords: DL 3 x 1-   TG Level 4 x 10  DL jumps (2 cord)  TG Level 4 2 x 10 Staggered stance Jump (R Lead) / DL landing  Step Tap Backs 4\" 2 x 10   DB Squat With rotational OH Press: 8# x 10   TRX bilateral Vertical jump: 2 x 10   SL step up to lunge: x 10 R/L  SLS + ball toss x 20   SLS on Airex: 20\" x 5   Lunge TKE (R foot in front) Blue 2 x 10   Wobble Board F/L 30\" x 5 ea (yellow tloop)  RDL DL 2 x 10 10# / B-Stance 2x10 R/L 10#  TRX squats/SL squats/ R LE reverse lunge (partial range) x 10 ea   Standing Forward bend (ankle PF/DF) x 10   Prone Hang for knee extension: 3'   Prone HS curls: 2# x 30 Standing Forward bend (ankle PF/DF) x 10   Lateral Tap Down: 4\" 2 x 10 ea (second set with TKE green TB)  Forward Step Ups 8 \" with tband behind knee: 2 x 10   Standing Tband clamshells: yellow 2 x 10   Inchworm (Tband) @ barre x 4 Green (band around ankles)  Lateral/Anterior Tap Down: 4\" 2 x 10 ea   TKE squats (bilateral) 2 x 10   SL cone taps: x 10   HS stretch (manual knee extension) 5 minutes    Manual Therapy   minutes -Manual  -Not today  HS stretch and R knee extension Long sit with Manual assisted strap calf stretch on 1/2 foam roll      Modalities  Vasopneumatic Device     10 minutes -  34 deg mod compression with leg elevated for post treatment swelling     Goals:  Lower Extremity Goals  Lower Extremity Goals: By discharge, patient will:  1) Demonstrate independence with home exercise program for overall symptom management  2) Increase overall exercise tolerance without adverse reaction or increased chief complaint  3) Increase ROM of the R knee to 90 degrees (within 2-4 weeks) and WNL within 6-8 weeks in order to improve the ability to perform " essential ADLs  4) Increase strength of the R LE to 4/5 (within 6 weeks) and 5/5 within 12 weeks in order to improve the ability to perform essential ADLs  5) Report decrease in pain by >= 2 points to meet MCID  6) Increase score of LEFS by > 9 points to meet the MCID  7) Ascend and descend 1 flight of stairs reciprocally and safely without an increase in symptoms  8) Ambulate x WBAT with assistive device without an increase in symptoms at 6 weeks.  9) Ambulate x community distances without assistive device and without compensation or pain within 8-10 weeks.  10) Be able to perform the ADL of squatting to floor to  object without an increase or production of symptoms

## 2024-11-07 ENCOUNTER — TREATMENT (OUTPATIENT)
Dept: PHYSICAL THERAPY | Facility: CLINIC | Age: 39
End: 2024-11-07
Payer: COMMERCIAL

## 2024-11-07 DIAGNOSIS — M25.561 ACUTE PAIN OF RIGHT KNEE: ICD-10-CM

## 2024-11-07 DIAGNOSIS — R26.2 DIFFICULTY WALKING: ICD-10-CM

## 2024-11-07 DIAGNOSIS — Z98.890 S/P RECONSTRUCTION OF ACL OF RIGHT KNEE USING HAMSTRING AUTOGRAFT: ICD-10-CM

## 2024-11-07 PROCEDURE — 97110 THERAPEUTIC EXERCISES: CPT | Mod: GP | Performed by: PHYSICAL THERAPIST

## 2024-11-07 PROCEDURE — 97016 VASOPNEUMATIC DEVICE THERAPY: CPT | Mod: GP | Performed by: PHYSICAL THERAPIST

## 2024-11-07 NOTE — PROGRESS NOTES
Treatment note  Physical Therapy Treatment  Patient Name:Rylee Bear  MRN:[id number]  Today's Date:2024  Referred by: Kelton Kothari  Time Calculation  Start Time: 852  Stop Time: 950  Time Calculation (min): 58 min    Therapy Diagnosis  1. S/P reconstruction of ACL of right knee using hamstring autograft    2. Difficulty walking    3. Acute pain of right knee        Assessment: Progression on alter-g treadmill from 65% BW working up to 80% today with jogging up to 5.0 mph without compensation. Initiated SL plyometric strengthening today with SL vertical jumps with noted quad fatigue but minimal compensation. Denies pain.  Continued focus on quad strength and plyometric progression with goal to not compensate. Continues to progressing with HS mobility nearing end range of extension.     Plan: Continue to progress with mobility, plyometrics,  and SL activities.    Insurance  Visit number:  19   Approved number of visits: 40  Onset Date: 2024    Precautions/Fall Risk:weight bearing status: FWB Pacemaker no  Seizures No  Post Op Movement/Restrictions Yes 9/18 may now FWB    Subjective/Pain: Patient reports she hasn't done too much and I feel okay.   Current Pain Level (0-10): 0/10    HEP Compliance: Excellent    Objective/Outcome Measures:  Knee flexion: 135 deg   Knee extension: lackin deg (able to get full PROM extension)  Decreased Eccentric control with forward/lateral lowering  Lack of terminal knee extension during heel strike and weight acceptance phase  R Knee MMT:  Flexion: 4+/5  Extension: 4/5     Treatment  Therapeutic Exercise 43 minutes -NOT today  Upright Bike: 7' minutes   SL Squat to chair (+ Airex): 2 x 10   Forward Broad Jump: x 1  Forward Flexion for HS s: PF/DF x 10 ea  Bilateral Hops: x 10   SL Vertical Hops: 3 x 10   Alter-G 65%-80% Jogging up to 5.0 Mph for 8 minutes    Not today:  Reverse Lunges (on slider) front foot (R) elevated with Green TKE: 2 x 10   Side step - Squat -  "side step Vertical Jump: 2 x 10 R/L  TG Level 6 4 cords: DL 3 x 1-   TG Level 4 x 10  DL jumps (2 cord)  TG Level 4 2 x 10 Staggered stance Jump (R Lead) / DL landing  Step Tap Backs 4\" 2 x 10   DB Squat With rotational OH Press: 8# x 10   TRX bilateral Vertical jump: 2 x 10   SL step up to lunge: x 10 R/L  SLS + ball toss x 20   SLS on Airex: 20\" x 5   Lunge TKE (R foot in front) Blue 2 x 10   Wobble Board F/L 30\" x 5 ea (yellow tloop)  RDL DL 2 x 10 10# / B-Stance 2x10 R/L 10#  TRX squats/SL squats/ R LE reverse lunge (partial range) x 10 ea   Standing Forward bend (ankle PF/DF) x 10   Prone Hang for knee extension: 3'   Prone HS curls: 2# x 30 Standing Forward bend (ankle PF/DF) x 10   Lateral Tap Down: 4\" 2 x 10 ea (second set with TKE green TB)  Forward Step Ups 8 \" with tband behind knee: 2 x 10   Standing Tband clamshells: yellow 2 x 10   Inchworm (Tband) @ barre x 4 Green (band around ankles)  Lateral/Anterior Tap Down: 4\" 2 x 10 ea   TKE squats (bilateral) 2 x 10   SL cone taps: x 10   HS stretch (manual knee extension) 5 minutes    Manual Therapy   minutes -Manual  -Not today  HS stretch and R knee extension Long sit with Manual assisted strap calf stretch on 1/2 foam roll      Modalities  Vasopneumatic Device     10 minutes -  34 deg mod compression with leg elevated for post treatment swelling     Goals:  Lower Extremity Goals  Lower Extremity Goals: By discharge, patient will:  1) Demonstrate independence with home exercise program for overall symptom management  2) Increase overall exercise tolerance without adverse reaction or increased chief complaint  3) Increase ROM of the R knee to 90 degrees (within 2-4 weeks) and WNL within 6-8 weeks in order to improve the ability to perform essential ADLs  4) Increase strength of the R LE to 4/5 (within 6 weeks) and 5/5 within 12 weeks in order to improve the ability to perform essential ADLs  5) Report decrease in pain by >= 2 points to meet MCID  6) Increase " score of LEFS by > 9 points to meet the MCID  7) Ascend and descend 1 flight of stairs reciprocally and safely without an increase in symptoms  8) Ambulate x WBAT with assistive device without an increase in symptoms at 6 weeks.  9) Ambulate x community distances without assistive device and without compensation or pain within 8-10 weeks.  10) Be able to perform the ADL of squatting to floor to  object without an increase or production of symptoms

## 2024-11-11 ENCOUNTER — TREATMENT (OUTPATIENT)
Dept: PHYSICAL THERAPY | Facility: CLINIC | Age: 39
End: 2024-11-11
Payer: COMMERCIAL

## 2024-11-11 DIAGNOSIS — M25.561 ACUTE PAIN OF RIGHT KNEE: ICD-10-CM

## 2024-11-11 DIAGNOSIS — Z98.890 S/P RECONSTRUCTION OF ACL OF RIGHT KNEE USING HAMSTRING AUTOGRAFT: ICD-10-CM

## 2024-11-11 DIAGNOSIS — R26.2 DIFFICULTY WALKING: ICD-10-CM

## 2024-11-11 PROCEDURE — 97110 THERAPEUTIC EXERCISES: CPT | Mod: GP | Performed by: PHYSICAL THERAPIST

## 2024-11-11 NOTE — PROGRESS NOTES
Treatment note  Physical Therapy Treatment  Patient Name:Rylee Bear  MRN:[id number]  Today's Date:2024  Referred by: Kelton Kothari  Time Calculation  Start Time:   Stop Time: 161  Time Calculation (min): 46 min    Therapy Diagnosis  1. S/P reconstruction of ACL of right knee using hamstring autograft    2. Difficulty walking    3. Acute pain of right knee        Assessment: Progression on alter-g treadmill to 85% today with jogging up to 5.0 mph with slight compensation due to fatigue since performed at end of session. Patient able to tolerate kneeling today but unable to tolerate full weight through R LE in kneeling position when going to stand up. Continues with SL plyometric strengthening today with focus on bounding and eccentric control. Denies pain just anterior knee pressure.  Continued focus on quad strength and plyometric progression with goal to not compensate. Continues to progressing with HS mobility nearing end range of extension.     Plan: Continue to progress with mobility, plyometrics,  and SL activities.    Insurance  Visit number:  20   Approved number of visits: 40  Onset Date: 2024    Precautions/Fall Risk:weight bearing status: FWB Pacemaker no  Seizures No  Post Op Movement/Restrictions Yes 9/18 may now FWB    Subjective/Pain: Patient reports she is feeling good. No complaints other than some unsteadiness with stairs.   Current Pain Level (0-10): 0/10    HEP Compliance: Excellent    Objective/Outcome Measures:  Knee flexion: 135 deg   Knee extension: lackin deg (able to get full PROM extension)  Decreased Eccentric control with forward/lateral lowering  Lack of terminal knee extension during heel strike and weight acceptance phase  R Knee MMT:  Flexion: 4+/5  Extension: 4/5     Treatment  Therapeutic Exercise 46 minutes -  Air Squat: x 10   Staggered Stance Squat: x 10   Forward Flexion for HS s: PF/DF x 10 ea  Kneeling + sit back for knee flexion: x 10   Kneel to stand  "Lead with R LE: x 10   SL Squat to chair (+ Airex): 2 x 10   Forward DL to SL bound:   Forward Broad Jump: x 1  Bilateral Hops: to squat 10 x 10   SL Vertical Hops: 2 x 10   Alt SL hops: x 15 ea R/L   Alter-G 85% Jogging up to 5.0 Mph for 5minutes    Not today:  Reverse Lunges (on slider) front foot (R) elevated with Green TKE: 2 x 10   Side step - Squat - side step Vertical Jump: 2 x 10 R/L  TG Level 6 4 cords: DL 3 x 1-   TG Level 4 x 10  DL jumps (2 cord)  TG Level 4 2 x 10 Staggered stance Jump (R Lead) / DL landing  Step Tap Backs 4\" 2 x 10   DB Squat With rotational OH Press: 8# x 10   TRX bilateral Vertical jump: 2 x 10   SL step up to lunge: x 10 R/L  SLS + ball toss x 20   SLS on Airex: 20\" x 5   Lunge TKE (R foot in front) Blue 2 x 10   Wobble Board F/L 30\" x 5 ea (yellow tloop)  RDL DL 2 x 10 10# / B-Stance 2x10 R/L 10#  TRX squats/SL squats/ R LE reverse lunge (partial range) x 10 ea   Standing Forward bend (ankle PF/DF) x 10   Prone Hang for knee extension: 3'   Prone HS curls: 2# x 30 Standing Forward bend (ankle PF/DF) x 10   Lateral Tap Down: 4\" 2 x 10 ea (second set with TKE green TB)  Forward Step Ups 8 \" with tband behind knee: 2 x 10   Standing Tband clamshells: yellow 2 x 10   Inchworm (Tband) @ barre x 4 Green (band around ankles)  Lateral/Anterior Tap Down: 4\" 2 x 10 ea   TKE squats (bilateral) 2 x 10   SL cone taps: x 10   HS stretch (manual knee extension) 5 minutes    Manual Therapy   minutes -Manual  -Not today  HS stretch and R knee extension Long sit with Manual assisted strap calf stretch on 1/2 foam roll      Modalities  Vasopneumatic Device       minutes -  34 deg mod compression with leg elevated for post treatment swelling     Goals:  Lower Extremity Goals  Lower Extremity Goals: By discharge, patient will:  1) Demonstrate independence with home exercise program for overall symptom management  2) Increase overall exercise tolerance without adverse reaction or increased chief " complaint  3) Increase ROM of the R knee to 90 degrees (within 2-4 weeks) and WNL within 6-8 weeks in order to improve the ability to perform essential ADLs  4) Increase strength of the R LE to 4/5 (within 6 weeks) and 5/5 within 12 weeks in order to improve the ability to perform essential ADLs  5) Report decrease in pain by >= 2 points to meet MCID  6) Increase score of LEFS by > 9 points to meet the MCID  7) Ascend and descend 1 flight of stairs reciprocally and safely without an increase in symptoms  8) Ambulate x WBAT with assistive device without an increase in symptoms at 6 weeks.  9) Ambulate x community distances without assistive device and without compensation or pain within 8-10 weeks.  10) Be able to perform the ADL of squatting to floor to  object without an increase or production of symptoms

## 2024-11-13 ENCOUNTER — OFFICE VISIT (OUTPATIENT)
Dept: PRIMARY CARE | Facility: CLINIC | Age: 39
End: 2024-11-13
Payer: COMMERCIAL

## 2024-11-13 DIAGNOSIS — F98.8 ATTENTION DEFICIT DISORDER (ADD) IN ADULT: ICD-10-CM

## 2024-11-13 PROCEDURE — 99213 OFFICE O/P EST LOW 20 MIN: CPT | Performed by: NURSE PRACTITIONER

## 2024-11-13 NOTE — PROGRESS NOTES
Subjective   Patient ID: Rylee Bear is a 39 y.o. female who presents for No chief complaint on file..  Doing well, no new concerns, remains effective  Aware of dosage timing to avoid sleep interruption.        128/78      Review of Systems   Cardiovascular:  Negative for chest pain and palpitations.   Neurological:  Negative for dizziness, light-headedness and headaches.     kkkok  Objective   Physical Exam  Constitutional:       Appearance: Normal appearance. She is not ill-appearing.   Cardiovascular:      Rate and Rhythm: Normal rate and regular rhythm.   Pulmonary:      Effort: Pulmonary effort is normal.      Breath sounds: Normal breath sounds.   Musculoskeletal:      Cervical back: Normal range of motion and neck supple.   Neurological:      General: No focal deficit present.      Mental Status: She is oriented to person, place, and time.         Assessment/Plan            TIRSO Rader-CNP 11/13/24 12:07 PM

## 2024-11-14 ENCOUNTER — TREATMENT (OUTPATIENT)
Dept: PHYSICAL THERAPY | Facility: CLINIC | Age: 39
End: 2024-11-14
Payer: COMMERCIAL

## 2024-11-14 DIAGNOSIS — M25.561 ACUTE PAIN OF RIGHT KNEE: ICD-10-CM

## 2024-11-14 DIAGNOSIS — Z98.890 S/P RECONSTRUCTION OF ACL OF RIGHT KNEE USING HAMSTRING AUTOGRAFT: ICD-10-CM

## 2024-11-14 DIAGNOSIS — R26.2 DIFFICULTY WALKING: ICD-10-CM

## 2024-11-14 PROCEDURE — 97110 THERAPEUTIC EXERCISES: CPT | Mod: GP | Performed by: PHYSICAL THERAPIST

## 2024-11-14 NOTE — PROGRESS NOTES
Treatment note  Physical Therapy Treatment  Patient Name:Rylee Bear  MRN:[id number]  Today's Date:11/14/2024  Referred by: Kelton Kothari  Time Calculation  Start Time: 0759  Stop Time: 0845  Time Calculation (min): 46 min    Therapy Diagnosis  1. S/P reconstruction of ACL of right knee using hamstring autograft    2. Difficulty walking    3. Acute pain of right knee        Assessment: Attempted walk/jog progression today on TM FBW. Patient is roughly 15 weeks s/p ACL and meniscus and is making improvements in overall function and strength toward meeting her PT goals. She continues to present with significant strength deficits in the R LE compared to the L side specifically the quad muscle. Requires verbal and visual cuing of exercises.     Plan: Continue to progress with mobility, plyometrics,  and SL activities.    Insurance  Visit number:  21   Approved number of visits: 40  Onset Date: 8/8/2024    Precautions/Fall Risk:weight bearing status: FWB Pacemaker no  Seizures No  Post Op Movement/Restrictions Yes 9/18 may now FWB    Subjective/Pain: Patient reports she was a little swollen and sore after last session introducing kneeling and new plyometric activities, but iced and feels good today. She feels ready to try running without assistance.   Current Pain Level (0-10): 0/10    HEP Compliance: Excellent    Objective/Outcome Measures:  R knee flexion WNL    Treatment  Therapeutic Exercise   minutes -  TG Level 6 4 cords: DL 3 x 10  TG Level 4 x 10  DL jumps (2 cord)  TG Level 4 2 x 10 Staggered stance Jump (R Lead) / DL landing  TM walk//Jog 3.0 // 4.3 mph for 510 min (5 min bhavna)   Forward Flexion for HS s: PF/DF x 10 ea  Staggered Stance Squat: x 10   Standing Tband clamshells: yellow 2 x 15   SL Squat to chair (+ Airex): 2 x 10   F/R lunge: x10 R/L     Not today:  Reverse Lunges (on slider) front foot (R) elevated with Green TKE: 2 x 10   Side step - Squat - side step Vertical Jump: 2 x 10 R/L  Bilateral  "Hops: to squat 10 x 10   SL Vertical Hops: 2 x 10   Alt SL hops: x 15 ea R/L   Forward DL to SL bound:   Alter-G 85% Jogging up to 5.0 Mph for 5minute  DB Squat With rotational OH Press: 8# x 10   TRX bilateral Vertical jump: 2 x 10   SL step up to lunge: x 10 R/L  Lunge TKE (R foot in front) Blue 2 x 10   Wobble Board F/L 30\" x 5 ea (yellow tloop)  RDL DL 2 x 10 10# / B-Stance 2x10 R/L 10#  TRX squats/SL squats/ R LE reverse lunge (partial range) x 10 ea   Standing Tband clamshells: yellow 2 x 10   Inchworm (Tband) @ barre x 4 Green (band around ankles)  Lateral/Anterior Tap Down: 4\" 2 x 10 ea   TKE squats (bilateral) 2 x 10   SL cone taps: x 10       Manual Therapy   minutes -Manual  -Not today  HS stretch and R knee extension Long sit with Manual assisted strap calf stretch on 1/2 foam roll      Modalities  Vasopneumatic Device       minutes -  34 deg mod compression with leg elevated for post treatment swelling     Goals:  Lower Extremity Goals  Lower Extremity Goals: By discharge, patient will:  1) Demonstrate independence with home exercise program for overall symptom management  2) Increase overall exercise tolerance without adverse reaction or increased chief complaint  3) Increase ROM of the R knee to 90 degrees (within 2-4 weeks) and WNL within 6-8 weeks in order to improve the ability to perform essential ADLs  4) Increase strength of the R LE to 4/5 (within 6 weeks) and 5/5 within 12 weeks in order to improve the ability to perform essential ADLs  5) Report decrease in pain by >= 2 points to meet MCID  6) Increase score of LEFS by > 9 points to meet the MCID  7) Ascend and descend 1 flight of stairs reciprocally and safely without an increase in symptoms  8) Ambulate x WBAT with assistive device without an increase in symptoms at 6 weeks.  9) Ambulate x community distances without assistive device and without compensation or pain within 8-10 weeks.  10) Be able to perform the ADL of squatting to floor to "  object without an increase or production of symptoms

## 2024-11-15 RX ORDER — DEXTROAMPHETAMINE SACCHARATE, AMPHETAMINE ASPARTATE, DEXTROAMPHETAMINE SULFATE AND AMPHETAMINE SULFATE 5; 5; 5; 5 MG/1; MG/1; MG/1; MG/1
40 TABLET ORAL DAILY
Qty: 90 TABLET | Refills: 0 | Status: SHIPPED | OUTPATIENT
Start: 2025-01-15 | End: 2025-02-14

## 2024-11-15 RX ORDER — DEXTROAMPHETAMINE SACCHARATE, AMPHETAMINE ASPARTATE, DEXTROAMPHETAMINE SULFATE AND AMPHETAMINE SULFATE 5; 5; 5; 5 MG/1; MG/1; MG/1; MG/1
40 TABLET ORAL DAILY
Qty: 90 TABLET | Refills: 0 | Status: SHIPPED | OUTPATIENT
Start: 2024-12-15 | End: 2025-01-14

## 2024-11-15 RX ORDER — DEXTROAMPHETAMINE SACCHARATE, AMPHETAMINE ASPARTATE, DEXTROAMPHETAMINE SULFATE AND AMPHETAMINE SULFATE 5; 5; 5; 5 MG/1; MG/1; MG/1; MG/1
40 TABLET ORAL DAILY
Qty: 90 TABLET | Refills: 0 | Status: SHIPPED | OUTPATIENT
Start: 2024-11-15 | End: 2024-11-15

## 2024-11-15 RX ORDER — DEXTROAMPHETAMINE SACCHARATE, AMPHETAMINE ASPARTATE, DEXTROAMPHETAMINE SULFATE AND AMPHETAMINE SULFATE 5; 5; 5; 5 MG/1; MG/1; MG/1; MG/1
40 TABLET ORAL DAILY
Qty: 90 TABLET | Refills: 0 | Status: SHIPPED | OUTPATIENT
Start: 2024-12-15 | End: 2024-11-15

## 2024-11-18 ENCOUNTER — TREATMENT (OUTPATIENT)
Dept: PHYSICAL THERAPY | Facility: CLINIC | Age: 39
End: 2024-11-18
Payer: COMMERCIAL

## 2024-11-18 DIAGNOSIS — F98.8 ATTENTION DEFICIT DISORDER (ADD) IN ADULT: ICD-10-CM

## 2024-11-18 DIAGNOSIS — Z98.890 S/P RECONSTRUCTION OF ACL OF RIGHT KNEE USING HAMSTRING AUTOGRAFT: ICD-10-CM

## 2024-11-18 DIAGNOSIS — M25.561 ACUTE PAIN OF RIGHT KNEE: ICD-10-CM

## 2024-11-18 DIAGNOSIS — R26.2 DIFFICULTY WALKING: ICD-10-CM

## 2024-11-18 PROCEDURE — 97016 VASOPNEUMATIC DEVICE THERAPY: CPT | Mod: GP | Performed by: PHYSICAL THERAPIST

## 2024-11-18 PROCEDURE — 97110 THERAPEUTIC EXERCISES: CPT | Mod: GP | Performed by: PHYSICAL THERAPIST

## 2024-11-18 RX ORDER — DEXTROAMPHETAMINE SACCHARATE, AMPHETAMINE ASPARTATE, DEXTROAMPHETAMINE SULFATE AND AMPHETAMINE SULFATE 5; 5; 5; 5 MG/1; MG/1; MG/1; MG/1
40 TABLET ORAL DAILY
Qty: 90 TABLET | Refills: 0 | Status: SHIPPED | OUTPATIENT
Start: 2024-11-18 | End: 2024-11-18 | Stop reason: SDUPTHER

## 2024-11-18 RX ORDER — DEXTROAMPHETAMINE SACCHARATE, AMPHETAMINE ASPARTATE, DEXTROAMPHETAMINE SULFATE AND AMPHETAMINE SULFATE 5; 5; 5; 5 MG/1; MG/1; MG/1; MG/1
40 TABLET ORAL DAILY
Qty: 90 TABLET | Refills: 0 | Status: SHIPPED | OUTPATIENT
Start: 2024-11-18 | End: 2025-02-16

## 2024-11-18 NOTE — PROGRESS NOTES
"Treatment note  Physical Therapy Treatment  Patient Name:Rylee Bear  MRN:[id number]  Today's Date:2024  Referred by: Kelton Kothari  Time Calculation  Start Time: 0750  Stop Time: 855  Time Calculation (min): 65 min    Therapy Diagnosis  1. S/P reconstruction of ACL of right knee using hamstring autograft    2. Difficulty walking    3. Acute pain of right knee        Assessment: Continuing with walk/jog progression today on TM FBW. She continues to present with slight decrease stance time on R and eccentric quad control.   Focusing treatment on plyometric activities and eccentric quad control for normalizing running techniques. Requires verbal and visual cuing of exercises.     Plan: Continue to progress with mobility, plyometrics,  and SL activities.    Insurance  Visit number:  22   Approved number of visits: 40  Onset Date: 2024    Precautions/Fall Risk:weight bearing status: FWB Pacemaker no  Seizures No  Post Op Movement/Restrictions Yes 9/18 may now FWB    Subjective/Pain: Patient reports she was able to walk/jog outside since last session but does continue to notice some compensation still but no pain.   Current Pain Level (0-10): 0/10    HEP Compliance: Excellent    Objective/Outcome Measures:  R knee flexion WNL    Treatment  Therapeutic Exercise 50 minutes - 3 units  TM walk 5 min 1:1 walk jog up to 4.6 mph for 15 min  Standing Tband clamshells: red 2 x 15   TG Level 7 4 cords: DL 3 x 10  Box Jumps: x 10 4\"  2 x 10 6\"  Box Bounding: 2 x 10   Double Leg boundin\" Sl boundin\"x 20 ea   Inchworm (Tband -red) @ barre x 5 red (band around knees)  SL Squat to chair (+ Airex): 2 x 10       Not today:  Reverse Lunges (on slider) front foot (R) elevated with Green TKE: 2 x 10   Bilateral Hops: to squat 10 x 10   SL Vertical Hops: 2 x 10   Alt SL hops: x 15 ea R/L   Forward DL to SL bound:   Alter-G 85% Jogging up to 5.0 Mph for 5minute  DB Squat With rotational OH Press: 8# x 10   TRX bilateral " "Vertical jump: 2 x 10   SL step up to lunge: x 10 R/L  Lunge TKE (R foot in front) Blue 2 x 10   Wobble Board F/L 30\" x 5 ea (yellow tloop)  RDL DL 2 x 10 10# / B-Stance 2x10 R/L 10#  TRX squats/SL squats/ R LE reverse lunge (partial range) x 10 ea   Lateral/Anterior Tap Down: 4\" 2 x 10 ea   TKE squats (bilateral) 2 x 10   SL cone taps: x 10     Manual Therapy   minutes -Manual  -Not today  HS stretch and R knee extension Long sit with Manual assisted strap calf stretch on 1/2 foam roll      Modalities - 1 unit  Vasopneumatic Device     15 minutes -  34 deg mod compression with leg elevated for post treatment swelling     Goals:  Lower Extremity Goals  Lower Extremity Goals: By discharge, patient will:  1) Demonstrate independence with home exercise program for overall symptom management  2) Increase overall exercise tolerance without adverse reaction or increased chief complaint  3) Increase ROM of the R knee to 90 degrees (within 2-4 weeks) and WNL within 6-8 weeks in order to improve the ability to perform essential ADLs  4) Increase strength of the R LE to 4/5 (within 6 weeks) and 5/5 within 12 weeks in order to improve the ability to perform essential ADLs  5) Report decrease in pain by >= 2 points to meet MCID  6) Increase score of LEFS by > 9 points to meet the MCID  7) Ascend and descend 1 flight of stairs reciprocally and safely without an increase in symptoms  8) Ambulate x WBAT with assistive device without an increase in symptoms at 6 weeks.  9) Ambulate x community distances without assistive device and without compensation or pain within 8-10 weeks.  10) Be able to perform the ADL of squatting to floor to  object without an increase or production of symptoms    "

## 2024-11-21 ENCOUNTER — TREATMENT (OUTPATIENT)
Dept: PHYSICAL THERAPY | Facility: CLINIC | Age: 39
End: 2024-11-21
Payer: COMMERCIAL

## 2024-11-21 DIAGNOSIS — M25.561 ACUTE PAIN OF RIGHT KNEE: ICD-10-CM

## 2024-11-21 DIAGNOSIS — Z98.890 S/P RECONSTRUCTION OF ACL OF RIGHT KNEE USING HAMSTRING AUTOGRAFT: ICD-10-CM

## 2024-11-21 DIAGNOSIS — R26.2 DIFFICULTY WALKING: ICD-10-CM

## 2024-11-21 PROCEDURE — 97110 THERAPEUTIC EXERCISES: CPT | Mod: GP | Performed by: PHYSICAL THERAPIST

## 2024-11-21 NOTE — PROGRESS NOTES
"Treatment note  Physical Therapy Treatment  Patient Name:Rylee Bear  MRN:[id number]  Today's Date:2024  Referred by: Kelton Kothari  Time Calculation  Start Time: 0800  Stop Time: 0845  Time Calculation (min): 45 min    Therapy Diagnosis  1. S/P reconstruction of ACL of right knee using hamstring autograft    2. Difficulty walking    3. Acute pain of right knee        Assessment: Provided patient with plyometric progression today. Compensates slightly when she becomes fatigued. Continued challenge with unilateral exercises specifically eccentric lowering (I.e stair negotiation) and single leg hops today.     Plan: Continue to progress with mobility, plyometrics,  and SL activities.    Insurance  Visit number:  23   Approved number of visits: 40  Onset Date: 2024    Precautions/Fall Risk:weight bearing status: FWB Pacemaker no  Seizures No  Post Op Movement/Restrictions Yes 9/18 may now FWB    Subjective/Pain: Patient reports no issues today. Sleeping is better. No pain.   Current Pain Level (0-10): 0/10    HEP Compliance: Excellent    Objective/Outcome Measures:  R knee flexion WNL    Treatment  Therapeutic Exercise 45 minutes - 3 units  Bike Lvl 3 7 minutes   Forward/Reverse Lungin# x 10   Standing Tband clamshells: blue  x 15   TG Level 7 4 cords: DL 3 x 10  Lateral/Anterior Tap Down: 4\" 2 x 10 ea   Plyo Progression:   -Bilateral Hop 2 x 25  -Side Bilateral Hop 2 x 10   -Unilateral Hop 2 x 10   Box Bounding: 2 x 10   SL boundin\"x 20 ea     Not today:  DB Squat With rotational OH Press: 8# x 10   TRX bilateral Vertical jump: 2 x 10   SL step up to lunge: x 10 R/L  Lunge TKE (R foot in front) Blue 2 x 10   Wobble Board F/L 30\" x 5 ea (yellow tloop)  RDL DL 2 x 10 10# / B-Stance 2x10 R/L 10#  TRX squats/SL squats/ R LE reverse lunge (partial range) x 10 ea   Lateral/Anterior Tap Down: 4\" 2 x 10 ea   TKE squats (bilateral) 2 x 10   SL cone taps: x 10   Inchworm (Tband -red) @ barre x 5 red (band " around knees)  SL Squat to chair (+ Airex): 2 x 10     Manual Therapy   minutes -Manual  -Not today  HS stretch and R knee extension Long sit with Manual assisted strap calf stretch on 1/2 foam roll      Modalities - 1 unit  Vasopneumatic Device       minutes -  34 deg mod compression with leg elevated for post treatment swelling     Goals:  Lower Extremity Goals  Lower Extremity Goals: By discharge, patient will:  1) Demonstrate independence with home exercise program for overall symptom management-MET  2) Increase overall exercise tolerance without adverse reaction or increased chief complaint-Progressing  3) Increase ROM of the R knee to 90 degrees (within 2-4 weeks) and WNL within 6-8 weeks in order to improve the ability to perform essential ADLs-MET  4) Increase strength of the R LE to 4/5 (within 6 weeks) and 5/5 within 12 weeks in order to improve the ability to perform essential ADLs  5) Report decrease in pain by >= 2 points to meet MCID -MET  6) Increase score of LEFS by > 9 points to meet the MCID  7) Ascend and descend 1 flight of stairs reciprocally and safely without an increase in symptoms-Progressing   8) Ambulate x WBAT with assistive device without an increase in symptoms at 6 weeks.-MET  9) Ambulate x community distances without assistive device and without compensation or pain within 8-10 weeks.-  10) Be able to perform the ADL of squatting to floor to  object without an increase or production of symptoms

## 2024-11-25 ENCOUNTER — TREATMENT (OUTPATIENT)
Dept: PHYSICAL THERAPY | Facility: CLINIC | Age: 39
End: 2024-11-25
Payer: COMMERCIAL

## 2024-11-25 DIAGNOSIS — R26.2 DIFFICULTY WALKING: ICD-10-CM

## 2024-11-25 DIAGNOSIS — Z98.890 S/P RECONSTRUCTION OF ACL OF RIGHT KNEE USING HAMSTRING AUTOGRAFT: ICD-10-CM

## 2024-11-25 DIAGNOSIS — M25.561 ACUTE PAIN OF RIGHT KNEE: ICD-10-CM

## 2024-11-25 PROCEDURE — 97110 THERAPEUTIC EXERCISES: CPT | Mod: GP | Performed by: PHYSICAL THERAPIST

## 2024-11-25 NOTE — PROGRESS NOTES
"Treatment note  Physical Therapy Treatment  Patient Name:Rylee Bear  MRN:[id number]  Today's Date:2024  Referred by: Kelton Kothari  Time Calculation  Start Time: 144  Stop Time:   Time Calculation (min): 50 min    Therapy Diagnosis  1. S/P reconstruction of ACL of right knee using hamstring autograft    2. Difficulty walking    3. Acute pain of right knee        Assessment: Provided continues to have some compensation during running with decreased stance time on R, able to correct with cuing. Continue to perform plyometrics with progression as tolerated and without compensation. Compensates slightly when she becomes fatigued. Continued challenge with unilateral exercises on R single leg hops today.     Plan: Continue to progress with mobility, plyometrics,  and SL activities.    Insurance  Visit number:  24   Approved number of visits: 40  Onset Date: 2024    Precautions/Fall Risk:weight bearing status: FWB Pacemaker no  Seizures No  Post Op Movement/Restrictions Yes 9/18 may now FWB    Subjective/Pain: Patient reports no issues today. Sleeping is better. No pain.   Current Pain Level (0-10): 0/10    HEP Compliance: Excellent    Objective/Outcome Measures:  R knee flexion WNL    Treatment  Therapeutic Exercise 50 minutes - 3 units  Walk/Jog 1:1 4.5 mph  SL DL with Med ball OH R/L x 10   TG Level 7 4 cords: DL 3 x 10 (with pulse)  Step up onto Bosu: x 10 R/L  Reverse Lunge off Bosu: x 10 R/L  Lateral/Anterior Tap Down: 4\" 2 x 10 ea     Plyo Progression:   -Side Bilateral Hop 2 x 10   -Unilateral Hop 2 x 10   - SL boundin\"x 20 ea   --SL bounding alternate bounding to L x 10    --SL bounding + hop x 10     Not today:  DB Squat With rotational OH Press: 8# x 10   TRX bilateral Vertical jump: 2 x 10   SL step up to lunge: x 10 R/L  Lunge TKE (R foot in front) Blue 2 x 10   Wobble Board F/L 30\" x 5 ea (yellow tloop)  RDL DL 2 x 10 10# / B-Stance 2x10 R/L 10#  TRX squats/SL squats/ R LE reverse " "lunge (partial range) x 10 ea   Lateral/Anterior Tap Down: 4\" 2 x 10 ea   TKE squats (bilateral) 2 x 10   SL cone taps: x 10   Inchworm (Tband -red) @ barre x 5 red (band around knees)  SL Squat to chair (+ Airex): 2 x 10     Manual Therapy   minutes -Manual  -Not today  HS stretch and R knee extension Long sit with Manual assisted strap calf stretch on 1/2 foam roll      Modalities -  Vasopneumatic Device       minutes -  34 deg mod compression with leg elevated for post treatment swelling     Goals:  Lower Extremity Goals  Lower Extremity Goals: By discharge, patient will:  1) Demonstrate independence with home exercise program for overall symptom management-MET  2) Increase overall exercise tolerance without adverse reaction or increased chief complaint-Progressing  3) Increase ROM of the R knee to 90 degrees (within 2-4 weeks) and WNL within 6-8 weeks in order to improve the ability to perform essential ADLs-MET  4) Increase strength of the R LE to 4/5 (within 6 weeks) and 5/5 within 12 weeks in order to improve the ability to perform essential ADLs  5) Report decrease in pain by >= 2 points to meet MCID -MET  6) Increase score of LEFS by > 9 points to meet the MCID  7) Ascend and descend 1 flight of stairs reciprocally and safely without an increase in symptoms-Progressing   8) Ambulate x WBAT with assistive device without an increase in symptoms at 6 weeks.-MET  9) Ambulate x community distances without assistive device and without compensation or pain within 8-10 weeks.-  10) Be able to perform the ADL of squatting to floor to  object without an increase or production of symptoms    "

## 2024-12-02 ENCOUNTER — TREATMENT (OUTPATIENT)
Dept: PHYSICAL THERAPY | Facility: CLINIC | Age: 39
End: 2024-12-02
Payer: COMMERCIAL

## 2024-12-02 DIAGNOSIS — R26.2 DIFFICULTY WALKING: ICD-10-CM

## 2024-12-02 DIAGNOSIS — M25.561 ACUTE PAIN OF RIGHT KNEE: ICD-10-CM

## 2024-12-02 PROCEDURE — 97110 THERAPEUTIC EXERCISES: CPT | Mod: GP | Performed by: PHYSICAL THERAPIST

## 2024-12-02 PROCEDURE — 97016 VASOPNEUMATIC DEVICE THERAPY: CPT | Mod: GP | Performed by: PHYSICAL THERAPIST

## 2024-12-02 NOTE — PROGRESS NOTES
"Treatment note  Physical Therapy Treatment  Patient Name:Rylee Bear  MRN:[id number]  Today's Date:12/2/2024  Referred by: * No referring provider recorded for this case *  Time Calculation  Start Time: 0749  Stop Time: 0844  Time Calculation (min): 55 min    Therapy Diagnosis  1. Difficulty walking    2. Acute pain of right knee        Assessment: Patient continues to be challenged with both DL and SL plyometrics with minimal compensations and denies pain. Patient continues to fatigue with SL quad eccentrics causing increased compensation. Continuing with slow progressing with straight and lateral movement patterns and plyometrics. Some challenge with bounding but no pain.     Plan: Continue to progress with mobility, plyometrics,  and SL activities.    Insurance  Visit number:  25   Approved number of visits: 40  Onset Date: 8/8/2024    Precautions/Fall Risk:weight bearing status: FWB Pacemaker no  Seizures No  Post Op Movement/Restrictions No    Subjective/Pain: Patient reports she was very swollen after last session last week. She had minimal soreness and stiffness but the swelling has gone down significantly. Denies pain today just some swelling to the lateral and posterior knee.   Current Pain Level (0-10): 0/10    HEP Compliance: Excellent    Objective/Outcome Measures:  R knee flexion WNL    Treatment  Therapeutic Exercise 45 minutes - 3 units  Upright Bike:  5 minutes   TG Level 6 2 cords: DL 3 x 10 (with pulse)  TG Level 6 1 cord: 3 x 10 jumps  Side Shuffle to double hop: 2 x 5   SL Squat to chair: 3 x 10     Plyo Progression:   -Side Bilateral Hop 2 x 10   -Forward bilateral Hop 2 x 10   --Skater hops: 2 x 10   - SL forward hop // Diagonals: 4\"x 20 ea   -- Lunge Jump: x 20   --SL hop to DL land: 2 x 10     Not today:  DB Squat With rotational OH Press: 8# x 10   TRX bilateral Vertical jump: 2 x 10   SL step up to lunge: x 10 R/L  --SL bounding alternate bounding to L x 10    --SL bounding + hop x 10 " "  Lunge TKE (R foot in front) Blue 2 x 10   Wobble Board F/L 30\" x 5 ea (yellow tloop)  RDL DL 2 x 10 10# / B-Stance 2x10 R/L 10#  TRX squats/SL squats/ R LE reverse lunge (partial range) x 10 ea   Lateral/Anterior Tap Down: 4\" 2 x 10 ea   TKE squats (bilateral) 2 x 10   SL cone taps: x 10   Inchworm (Tband -red) @ barre x 5 red (band around knees)  SL Squat to chair (+ Airex): 2 x 10     Manual Therapy   minutes -Manual  -Not today  HS stretch and R knee extension Long sit with Manual assisted strap calf stretch on 1/2 foam roll      Modalities -  Vasopneumatic Device     10 minutes -  34 deg mod compression with leg elevated for post treatment swelling     Goals:  Lower Extremity Goals  Lower Extremity Goals: By discharge, patient will:  1) Demonstrate independence with home exercise program for overall symptom management-MET  2) Increase overall exercise tolerance without adverse reaction or increased chief complaint-Progressing  3) Increase ROM of the R knee to 90 degrees (within 2-4 weeks) and WNL within 6-8 weeks in order to improve the ability to perform essential ADLs-MET  4) Increase strength of the R LE to 4/5 (within 6 weeks) and 5/5 within 12 weeks in order to improve the ability to perform essential ADLs  5) Report decrease in pain by >= 2 points to meet MCID -MET  6) Increase score of LEFS by > 9 points to meet the MCID  7) Ascend and descend 1 flight of stairs reciprocally and safely without an increase in symptoms-Progressing   8) Ambulate x WBAT with assistive device without an increase in symptoms at 6 weeks.-MET  9) Ambulate x community distances without assistive device and without compensation or pain within 8-10 weeks.-  10) Be able to perform the ADL of squatting to floor to  object without an increase or production of symptoms    "

## 2024-12-05 ENCOUNTER — APPOINTMENT (OUTPATIENT)
Dept: PHYSICAL THERAPY | Facility: CLINIC | Age: 39
End: 2024-12-05
Payer: COMMERCIAL

## 2024-12-05 DIAGNOSIS — M25.561 ACUTE PAIN OF RIGHT KNEE: ICD-10-CM

## 2024-12-05 DIAGNOSIS — R26.2 DIFFICULTY WALKING: ICD-10-CM

## 2024-12-09 ENCOUNTER — TREATMENT (OUTPATIENT)
Dept: PHYSICAL THERAPY | Facility: CLINIC | Age: 39
End: 2024-12-09
Payer: COMMERCIAL

## 2024-12-09 DIAGNOSIS — M25.561 ACUTE PAIN OF RIGHT KNEE: ICD-10-CM

## 2024-12-09 DIAGNOSIS — R26.2 DIFFICULTY WALKING: ICD-10-CM

## 2024-12-09 PROCEDURE — 97110 THERAPEUTIC EXERCISES: CPT | Mod: GP | Performed by: PHYSICAL THERAPIST

## 2024-12-09 NOTE — PROGRESS NOTES
"Treatment note  Physical Therapy Treatment  Patient Name:Rylee Bear  MRN:[id number]  Today's Date:12/9/2024  Referred by: Taye  Time Calculation  Start Time: 0915  Stop Time: 1004  Time Calculation (min): 49 min    Therapy Diagnosis  1. Difficulty walking    2. Acute pain of right knee        Assessment: Initiated treatment with TM walk / jog. She presents with compensation of the R hip/knee today with decreased stance time. She also has some posterior knee swelling causing sensation of restriction. No TTP. Continues to present with R sided weakness and decreased stability with SL activities. Continue to focus on plyomtrics and improving SL strength for normalizing higher impact activities such as running. Continuing with slow progressing with straight and lateral movement patterns and plyometrics. Some challenge with bounding but no pain.     Plan: Continue to progress with mobility, plyometrics,  and SL activities.    Insurance  Visit number:  26   Approved number of visits: 40  Onset Date: 8/8/2024    Precautions/Fall Risk:weight bearing status: FWB Pacemaker no  Seizures No  Post Op Movement/Restrictions No    Subjective/Pain: Patient reports she is a little sore and swollen and hasn't been doing much since Thanksgiving due to being busy.   Current Pain Level (0-10): 2/10    HEP Compliance: Excellent    Objective/Outcome Measures:  R knee flexion WNL  Palpable swelling popliteal troy on R knee    Treatment  Therapeutic Exercise 49 minutes - 3 units  Walk 5 min, walk jog 1:1 5 minutes  TG Level 6 3 cords: DL 2 x 15 (with pulse)  TG SL eccentrics: 2 cords 2 x 10   TG Level 6 1 cord: 3 x 10 jumps (DL to SL, SL jump)  SL Squat to high low table: 3 x 10     Plyo:   -SL bounding x 10 R/L  -Box Jump to SL land x 10   -Box Jump SL Land + SL hop x 10     Not today:  -Side Bilateral Hop 2 x 10   -Forward bilateral Hop 2 x 10   --Skater hops: 2 x 10   - SL forward hop // Diagonals: 4\"x 20 ea   -- Lunge Jump: x 20 "   --SL hop to DL land: 2 x 10     Manual Therapy   minutes -Manual  -Not today  HS stretch and R knee extension Long sit with Manual assisted strap calf stretch on 1/2 foam roll      Modalities -  Vasopneumatic Device       minutes -  34 deg mod compression with leg elevated for post treatment swelling     Goals:  Lower Extremity Goals  Lower Extremity Goals: By discharge, patient will:  1) Demonstrate independence with home exercise program for overall symptom management-MET  2) Increase overall exercise tolerance without adverse reaction or increased chief complaint-Progressing  3) Increase ROM of the R knee to 90 degrees (within 2-4 weeks) and WNL within 6-8 weeks in order to improve the ability to perform essential ADLs-MET  4) Increase strength of the R LE to 4/5 (within 6 weeks) and 5/5 within 12 weeks in order to improve the ability to perform essential ADLs  5) Report decrease in pain by >= 2 points to meet MCID -MET  6) Increase score of LEFS by > 9 points to meet the MCID  7) Ascend and descend 1 flight of stairs reciprocally and safely without an increase in symptoms-Progressing   8) Ambulate x WBAT with assistive device without an increase in symptoms at 6 weeks.-MET  9) Ambulate x community distances without assistive device and without compensation or pain within 8-10 weeks.-  10) Be able to perform the ADL of squatting to floor to  object without an increase or production of symptoms

## 2024-12-12 ENCOUNTER — TREATMENT (OUTPATIENT)
Dept: PHYSICAL THERAPY | Facility: CLINIC | Age: 39
End: 2024-12-12
Payer: COMMERCIAL

## 2024-12-12 DIAGNOSIS — M25.561 ACUTE PAIN OF RIGHT KNEE: ICD-10-CM

## 2024-12-12 DIAGNOSIS — R26.2 DIFFICULTY WALKING: ICD-10-CM

## 2024-12-12 PROCEDURE — 97110 THERAPEUTIC EXERCISES: CPT | Mod: GP | Performed by: PHYSICAL THERAPIST

## 2024-12-12 NOTE — PROGRESS NOTES
"Treatment note  Physical Therapy Treatment  Patient Name:Rylee Bear  MRN:[id number]  Today's Date:12/12/2024  Referred by: Taye  Time Calculation  Start Time: 0810  Stop Time: 0850  Time Calculation (min): 40 min    Therapy Diagnosis  1. Difficulty walking    2. Acute pain of right knee        Assessment: Initiated treatment with TM walk on incline 5% followed by focus on SL eccentric quad strengthening and plyometrics. Patient continues with challenge dynamic single leg hopping and reaction. Educated patient on prioritizing quad strengthening.     Plan: Continue to progress with mobility, plyometrics,  and SL activities.  Isokinetic/metric quad strength testing next session    Insurance  Visit number:  27   Approved number of visits: 40  Onset Date: 8/8/2024    Precautions/Fall Risk:weight bearing status: FWB Pacemaker no  Seizures No  Post Op Movement/Restrictions No    Subjective/Pain: Patient reports she is achy today. Not sure if it's the weather or not. She also reports she is noticing how weak her quads are lately when performing.    Current Pain Level (0-10): 2/10    HEP Compliance: Excellent    Objective/Outcome Measures:  R knee flexion WNL  Palpable swelling popliteal troy on R knee    Treatment  Therapeutic Exercise 40 minutes - 3 units  Walk 5% incline 5 minutes  TG SL eccentrics: 2 cords 2 x 10   TG Level 5 1 cord: 1 x 10 jumps (DL to SL, SL jump)  Forward/Reverse Lunges: R/L x 10   Walking Lunges: 6# 10' length x 3   Lateral /Forward Tap Downs: 4\"    Plyo:   -Double leg hops: x 20  -SL vertical hops: x 20   - SL bounding x 10 R/L  -Box Jump to SL land x 10   -Box Jump SL Land + SL hop x 10     Not today:  -Side Bilateral Hop 2 x 10   -Forward bilateral Hop 2 x 10   --Skater hops: 2 x 10   - SL forward hop // Diagonals: 4\"x 20 ea   -- Lunge Jump: x 20   --SL hop to DL land: 2 x 10     Manual Therapy   minutes -Manual  -Not today  HS stretch and R knee extension Long sit with Manual assisted strap " calf stretch on 1/2 foam roll      Modalities -  Vasopneumatic Device       minutes -  34 deg mod compression with leg elevated for post treatment swelling     Goals:  Lower Extremity Goals  Lower Extremity Goals: By discharge, patient will:  1) Demonstrate independence with home exercise program for overall symptom management-MET  2) Increase overall exercise tolerance without adverse reaction or increased chief complaint-Progressing  3) Increase ROM of the R knee to 90 degrees (within 2-4 weeks) and WNL within 6-8 weeks in order to improve the ability to perform essential ADLs-MET  4) Increase strength of the R LE to 4/5 (within 6 weeks) and 5/5 within 12 weeks in order to improve the ability to perform essential ADLs  5) Report decrease in pain by >= 2 points to meet MCID -MET  6) Increase score of LEFS by > 9 points to meet the MCID  7) Ascend and descend 1 flight of stairs reciprocally and safely without an increase in symptoms-Progressing   8) Ambulate x WBAT with assistive device without an increase in symptoms at 6 weeks.-MET  9) Ambulate x community distances without assistive device and without compensation or pain within 8-10 weeks.-  10) Be able to perform the ADL of squatting to floor to  object without an increase or production of symptoms

## 2024-12-16 ENCOUNTER — TREATMENT (OUTPATIENT)
Dept: PHYSICAL THERAPY | Facility: CLINIC | Age: 39
End: 2024-12-16
Payer: COMMERCIAL

## 2024-12-16 DIAGNOSIS — R26.2 DIFFICULTY WALKING: ICD-10-CM

## 2024-12-16 DIAGNOSIS — M25.561 ACUTE PAIN OF RIGHT KNEE: ICD-10-CM

## 2024-12-16 PROCEDURE — 97110 THERAPEUTIC EXERCISES: CPT | Mod: GP | Performed by: PHYSICAL THERAPIST

## 2024-12-16 NOTE — PROGRESS NOTES
"Treatment note  Physical Therapy Treatment  Patient Name:Rylee Bear  MRN:[id number]  Today's Date:12/16/2024  Referred by: Taye  Time Calculation  Start Time: 0700  Stop Time: 0746  Time Calculation (min): 46 min    Therapy Diagnosis  1. Difficulty walking    2. Acute pain of right knee      Assessment: Took isokinetic/metric quad strength with digital dynamometer today. Reading in Objective measurements. She continues to compensate in R quad when fatigue; however, her overall form with SL and DL jumping and landing are improving. No pain noted during or following session. Focus continues to remain on quad and glute strengthening to improve stability and normalize running pattern.     Plan: Continue to progress with mobility, plyometrics,  and SL activities.      Insurance  Visit number:  28   Approved number of visits: 40  Onset Date: 8/8/2024    Precautions/Fall Risk:weight bearing status: FWB Pacemaker no  Seizures No  Post Op Movement/Restrictions No    Subjective/Pain: Patient reports she was sore after last session but in a good way. States she continues to work on mobility but feels the back side of her knee continues to feel \"puffy\"  Current Pain Level (0-10): 0/10    HEP Compliance: Excellent    Objective/Outcome Measures:  R knee flexion WNL  Palpable swelling popliteal troy on R knee  Isokinetic Testing Quad:   R: Avg of 3 attempts 54#  L: Avg of 3 attempts 74#    Treatment  Therapeutic Exercise 46 minutes - 3 units  Walk 5% incline 5 minutes  TG SL eccentrics: 2 cords 2 x 10   TG Level 5 4 cord: 2  x 10   TG jumps (DL to SL, SL jump) 2 cords 2 x 10   Lateral /Forward Tap Downs: 4\"    Plyo:   -Double leg hops: x 20 @ TRX  -SL lunge vertical hops: x 20 @ TRX   - SL bounding to  alternating SL forward bounding x 10 R/L  -Box Jump to vertical hop x 10   -Side Bilateral Hop 2 x 10   --Skater hops: 2 x 10     Not today:  - SL forward hop // Diagonals: 4\"x 20 ea   --SL hop to DL land: 2 x 10 "   Forward/Reverse Lunges: R/L x 10   Walking Lunges: 6# 10' length x 3     Manual Therapy   minutes -Manual  -Not today  HS stretch and R knee extension Long sit with Manual assisted strap calf stretch on 1/2 foam roll      Modalities -  Vasopneumatic Device       minutes -  34 deg mod compression with leg elevated for post treatment swelling     Goals:  Lower Extremity Goals  Lower Extremity Goals: By discharge, patient will:  1) Demonstrate independence with home exercise program for overall symptom management-MET  2) Increase overall exercise tolerance without adverse reaction or increased chief complaint-Progressing  3) Increase ROM of the R knee to 90 degrees (within 2-4 weeks) and WNL within 6-8 weeks in order to improve the ability to perform essential ADLs-MET  4) Increase strength of the R LE to 4/5 (within 6 weeks) and 5/5 within 12 weeks in order to improve the ability to perform essential ADLs  5) Report decrease in pain by >= 2 points to meet MCID -MET  6) Increase score of LEFS by > 9 points to meet the MCID  7) Ascend and descend 1 flight of stairs reciprocally and safely without an increase in symptoms-Progressing   8) Ambulate x WBAT with assistive device without an increase in symptoms at 6 weeks.-MET  9) Ambulate x community distances without assistive device and without compensation or pain within 8-10 weeks.-  10) Be able to perform the ADL of squatting to floor to  object without an increase or production of symptoms

## 2024-12-19 ENCOUNTER — APPOINTMENT (OUTPATIENT)
Dept: PHYSICAL THERAPY | Facility: CLINIC | Age: 39
End: 2024-12-19
Payer: COMMERCIAL

## 2024-12-19 DIAGNOSIS — R26.2 DIFFICULTY WALKING: ICD-10-CM

## 2024-12-19 DIAGNOSIS — M25.561 ACUTE PAIN OF RIGHT KNEE: ICD-10-CM

## 2024-12-23 ENCOUNTER — TREATMENT (OUTPATIENT)
Dept: PHYSICAL THERAPY | Facility: CLINIC | Age: 39
End: 2024-12-23
Payer: COMMERCIAL

## 2024-12-23 DIAGNOSIS — R26.2 DIFFICULTY WALKING: ICD-10-CM

## 2024-12-23 DIAGNOSIS — M25.561 ACUTE PAIN OF RIGHT KNEE: ICD-10-CM

## 2024-12-23 PROCEDURE — 97110 THERAPEUTIC EXERCISES: CPT | Mod: GP | Performed by: PHYSICAL THERAPIST

## 2024-12-23 NOTE — PROGRESS NOTES
"Treatment note  Physical Therapy Treatment  Patient Name:Rylee Bear  MRN:[id number]  Today's Date:12/23/2024  Referred by: Taye  Time Calculation  Start Time: 0830  Stop Time: 0905  Time Calculation (min): 35 min    Therapy Diagnosis  1. Difficulty walking    2. Acute pain of right knee      Assessment: Patient feeling sore and swollen due to increased standing activities on her feet all weekend. Educated patient about compression and anti-inflammatory methods to treat baker's cyst symptoms. She continues to be challenged with eccentric quad control specifically during single leg activities and plyometrics. Request to end session slightly early today due to schedule conflict.     Plan: Continue to progress with mobility, plyometrics,  and SL activities.    Insurance  Visit number:  29   Approved number of visits: 40  Onset Date: 8/8/2024    Precautions/Fall Risk:weight bearing status: FWB Pacemaker no  Seizures No  Post Op Movement/Restrictions No    Subjective/Pain: Patient reports she stood all weekend baking cookies so she is pretty sore. She also feels stiff and swollen behind her knee (possible bakers cyst)  Current Pain Level (0-10): 2/10    HEP Compliance: Excellent    Objective/Outcome Measures:  R knee flexion WNL  Palpable swelling popliteal troy on R knee  Isokinetic Testing Quad:   R: Avg of 3 attempts 54#  L: Avg of 3 attempts 74#    Treatment  Therapeutic Exercise 35 minutes - 3 units  Walk 5% incline 5 minutes  TG SL Lvl 7 eccentrics: 2 cords 2 x 10   TG Level 7 4 cord: 2  x 10   SL squats to cahir+ airex: x 15   Lateral /Forward Tap Downs: 4\" 20 x ea   Lunge Back Heel drive: x 15 R/L  Step Up Alt knee drive: 6\"x 15 R/L  Sliding Lateral Lunge: x 15 R/L     Plyo: (not today)  -Double leg hops: x 20 @ TRX  -SL lunge vertical hops: x 20 @ TRX   - SL bounding to  alternating SL forward bounding x 10 R/L  -Box Jump to vertical hop x 10   -Side Bilateral Hop 2 x 10   --Skater hops: 2 x 10   - SL " "forward hop // Diagonals: 4\"x 20 ea   --SL hop to DL land: 2 x 10   Forward/Reverse Lunges: R/L x 10   Walking Lunges: 6# 10' length x 3     Manual Therapy   minutes -Manual  -Not today  HS stretch and R knee extension Long sit with Manual assisted strap calf stretch on 1/2 foam roll      Modalities -  Vasopneumatic Device       minutes -  34 deg mod compression with leg elevated for post treatment swelling     Goals:  Lower Extremity Goals  Lower Extremity Goals: By discharge, patient will:  1) Demonstrate independence with home exercise program for overall symptom management-MET  2) Increase overall exercise tolerance without adverse reaction or increased chief complaint-Progressing  3) Increase ROM of the R knee to 90 degrees (within 2-4 weeks) and WNL within 6-8 weeks in order to improve the ability to perform essential ADLs-MET  4) Increase strength of the R LE to 4/5 (within 6 weeks) and 5/5 within 12 weeks in order to improve the ability to perform essential ADLs  5) Report decrease in pain by >= 2 points to meet MCID -MET  6) Increase score of LEFS by > 9 points to meet the MCID  7) Ascend and descend 1 flight of stairs reciprocally and safely without an increase in symptoms-Progressing   8) Ambulate x WBAT with assistive device without an increase in symptoms at 6 weeks.-MET  9) Ambulate x community distances without assistive device and without compensation or pain within 8-10 weeks.-  10) Be able to perform the ADL of squatting to floor to  object without an increase or production of symptoms    "

## 2024-12-30 ENCOUNTER — TREATMENT (OUTPATIENT)
Dept: PHYSICAL THERAPY | Facility: CLINIC | Age: 39
End: 2024-12-30
Payer: COMMERCIAL

## 2024-12-30 DIAGNOSIS — M25.561 ACUTE PAIN OF RIGHT KNEE: ICD-10-CM

## 2024-12-30 DIAGNOSIS — R26.2 DIFFICULTY WALKING: ICD-10-CM

## 2024-12-30 PROCEDURE — 97110 THERAPEUTIC EXERCISES: CPT | Mod: GP | Performed by: PHYSICAL THERAPIST

## 2024-12-30 ASSESSMENT — ENCOUNTER SYMPTOMS
PALPITATIONS: 0
DIZZINESS: 0
LIGHT-HEADEDNESS: 0
HEADACHES: 0

## 2024-12-30 NOTE — ASSESSMENT & PLAN NOTE
All protocols for ADD treatment are UTD, efficacy noted, will continue treatment.    Will need new PCP and follow 3 months.

## 2024-12-30 NOTE — PROGRESS NOTES
"Treatment note  Physical Therapy Treatment  Patient Name:Rylee Bear  MRN:[id number]  Today's Date:12/30/2024  Referred by: Taye  Time Calculation  Start Time: 0747  Stop Time: 0835  Time Calculation (min): 48 min    Therapy Diagnosis  1. Difficulty walking    2. Acute pain of right knee      Assessment: Patient has taken some time off from attempting running and focusing on strengthening due to continued compensations. Her single leg strength is improving. She requires some visual and verbal cuing for instruction and correction on form when completing exercise today. Presents with less fatigue and compensation on R LE.     Plan: Continue to progress with mobility, plyometrics,  and SL activities.  Decrease frequency to 1x/week in new year and focus on independent strengthening    Insurance  Visit number:  30   Approved number of visits: 40  Onset Date: 8/8/2024    Precautions/Fall Risk:weight bearing status: FWB Pacemaker no  Seizures No  Post Op Movement/Restrictions No    Subjective/Pain: Patient reports she feels tired from the Holidays. No real pain just fatigue and soreness.   Current Pain Level (0-10): 2/10    HEP Compliance: Excellent    Objective/Outcome Measures:  R knee flexion WNL  Palpable swelling popliteal troy on R knee  Kneeling to child's pose full range, slight discomfort popliteal troy    Treatment  Therapeutic Exercise 45 minutes - 3 units  Walk 5% incline 5 minutes  TG SL Lvl 7 eccentrics: 2 cords 2 x 10   TG Level 7 4 cord: 2  x 10   SL squats to chair+ airex: x 15   Lateral /Forward Tap Downs: 4\" 20 x ea   Lunge Back Heel drive: x 15 R/L  Sliding Lateral Lunge: x 15 R/L     Plyo: (not today)  -Double leg hops: x 20 @ TRX  -SL lunge vertical hops: x 20 @ TRX   - SL bounding to  alternating SL forward bounding x 10 R/L  -Box Jump to vertical hop x 10   -Side Bilateral Hop 2 x 10   --Skater hops: 2 x 10   - SL forward hop // Diagonals: 4\"x 20 ea   --SL hop to DL land: 2 x 10 "   Forward/Reverse Lunges: R/L x 10   Walking Lunges: 6# 10' length x 3     Manual Therapy   minutes -Manual  -Not today  HS stretch and R knee extension Long sit with Manual assisted strap calf stretch on 1/2 foam roll      Modalities -  Vasopneumatic Device       minutes -  34 deg mod compression with leg elevated for post treatment swelling     Goals:  Lower Extremity Goals  Lower Extremity Goals: By discharge, patient will:  1) Demonstrate independence with home exercise program for overall symptom management-MET  2) Increase overall exercise tolerance without adverse reaction or increased chief complaint-Progressing  3) Increase ROM of the R knee to 90 degrees (within 2-4 weeks) and WNL within 6-8 weeks in order to improve the ability to perform essential ADLs-MET  4) Increase strength of the R LE to 4/5 (within 6 weeks) and 5/5 within 12 weeks in order to improve the ability to perform essential ADLs  5) Report decrease in pain by >= 2 points to meet MCID -MET  6) Increase score of LEFS by > 9 points to meet the MCID  7) Ascend and descend 1 flight of stairs reciprocally and safely without an increase in symptoms-Progressing   8) Ambulate x WBAT with assistive device without an increase in symptoms at 6 weeks.-MET  9) Ambulate x community distances without assistive device and without compensation or pain within 8-10 weeks.-  10) Be able to perform the ADL of squatting to floor to  object without an increase or production of symptoms

## 2025-01-09 ENCOUNTER — TREATMENT (OUTPATIENT)
Dept: PHYSICAL THERAPY | Facility: CLINIC | Age: 40
End: 2025-01-09
Payer: COMMERCIAL

## 2025-01-09 DIAGNOSIS — M25.561 ACUTE PAIN OF RIGHT KNEE: ICD-10-CM

## 2025-01-09 DIAGNOSIS — R26.2 DIFFICULTY WALKING: ICD-10-CM

## 2025-01-09 PROCEDURE — 97110 THERAPEUTIC EXERCISES: CPT | Mod: GP | Performed by: PHYSICAL THERAPIST

## 2025-01-09 NOTE — PROGRESS NOTES
"Treatment note  Physical Therapy Treatment  Patient Name:Rylee Bear  MRN:[id number]  Today's Date:1/10/2025  Referred by: Taye  Time Calculation  Start Time: 1145  Stop Time: 1230  Time Calculation (min): 45 min    Therapy Diagnosis  1. Difficulty walking    2. Acute pain of right knee      Assessment: Patient challenged with BFRT today on total gym to focus on quad strengthening, unable to complete all reps due to fatigue. Continued weight shift with plyomtrics away from involved leg but able to correct with cuing. No pain present during treatment just fatigue.     Plan: Take quad strength isokinetic measurement  Continue to progress with mobility, plyometrics,  and SL activities.  Decrease frequency to 1x/week or every 2 weeks in new year and focus on independent strengthening    Insurance  Visit number:  31   Approved number of visits: [place] (new insurance)  Onset Date: 8/8/2024    Precautions/Fall Risk:weight bearing status: FWB Pacemaker no  Seizures No  Post Op Movement/Restrictions No    Subjective/Pain: Patient reports overall her plan is to focus on strengthening before she increases her running. She states she notices the pressure behind her knee to be somewhat bothersome and her continued strength deficits are still noticeable.   Current Pain Level (0-10): 2/10    HEP Compliance: Excellent    Objective/Outcome Measures:  R knee flexion WNL  Palpable swelling popliteal troy on R knee  Kneeling to child's pose full range, slight discomfort popliteal troy    Treatment  Therapeutic Exercise 45 minutes - 3 units  BFRT on TG SL Squat (no resistance) Lvl 5 LE STR setting 30:15:10:8   SL squats from chair to lunge: x 10  Lateral /Forward Tap Downs: 4\" 30 x ea   SL Hip Antoine + Reach: x 15   Mountain Climbers: 3 x 10     Plyo:   -Double leg hops: x 20   -Broad Jumps DL X 5  -DL to SL Broad Jump x 10   -DL Box Jump to /off x 10 6\"      Not today:  -SL lunge vertical hops: x 20 @ TRX   - SL bounding to  alternating " "SL forward bounding x 10 R/L  -Box Jump to vertical hop x 10   -Side Bilateral Hop 2 x 10   --Skater hops: 2 x 10   - SL forward hop // Diagonals: 4\"x 20 ea   --SL hop to DL land: 2 x 10   Forward/Reverse Lunges: R/L x 10   Walking Lunges: 6# 10' length x 3   TG SL Lvl 7 eccentrics: 2 cords 2 x 10   TG Level 7 4 cord: 2  x 10     Manual Therapy   minutes -Manual  -Not today  HS stretch and R knee extension Long sit with Manual assisted strap calf stretch on 1/2 foam roll      Modalities -  Vasopneumatic Device       minutes -  34 deg mod compression with leg elevated for post treatment swelling     Goals:  Lower Extremity Goals  Lower Extremity Goals: By discharge, patient will:  1) Demonstrate independence with home exercise program for overall symptom management-MET  2) Increase overall exercise tolerance without adverse reaction or increased chief complaint-Progressing  3) Increase ROM of the R knee to 90 degrees (within 2-4 weeks) and WNL within 6-8 weeks in order to improve the ability to perform essential ADLs-MET  4) Increase strength of the R LE to 4/5 (within 6 weeks) and 5/5 within 12 weeks in order to improve the ability to perform essential ADLs  5) Report decrease in pain by >= 2 points to meet MCID -MET  6) Increase score of LEFS by > 9 points to meet the MCID  7) Ascend and descend 1 flight of stairs reciprocally and safely without an increase in symptoms-Progressing   8) Ambulate x WBAT with assistive device without an increase in symptoms at 6 weeks.-MET  9) Ambulate x community distances without assistive device and without compensation or pain within 8-10 weeks.-  10) Be able to perform the ADL of squatting to floor to  object without an increase or production of symptoms    "

## 2025-01-23 ENCOUNTER — TREATMENT (OUTPATIENT)
Dept: PHYSICAL THERAPY | Facility: CLINIC | Age: 40
End: 2025-01-23
Payer: COMMERCIAL

## 2025-01-23 DIAGNOSIS — M25.561 ACUTE PAIN OF RIGHT KNEE: ICD-10-CM

## 2025-01-23 DIAGNOSIS — R26.2 DIFFICULTY WALKING: ICD-10-CM

## 2025-01-23 PROCEDURE — 97110 THERAPEUTIC EXERCISES: CPT | Mod: GP | Performed by: PHYSICAL THERAPIST

## 2025-01-23 NOTE — PROGRESS NOTES
"Treatment note  Physical Therapy Treatment  Patient Name:Rylee Bear  MRN:[id number]  Today's Date:1/23/2025  Referred by: Taye  Time Calculation  Start Time: 1145  Stop Time: 1230  Time Calculation (min): 45 min    Therapy Diagnosis  1. Difficulty walking    2. Acute pain of right knee      Assessment: Patient has not run since last time she ran in therapy. Initiated light jogging today with cuing on form. Has been focusing on strengthening outside of therapy with continued improvements noted via isometric strength measurements today. See objective. She continues to have most difficulty and notices compensation with stair negotiation. She requires some visual and verbal cuing for instruction and correction on form when completing exercise today. Presents with fatigue following max SL jump on TG.     Plan: Continue to progress with mobility, plyometrics,  and SL activities.  Decrease frequency to 1x/week every other in new year and focus on independent strengthening    Insurance  Visit number:  32 (2 in new year)  Approved number of visits: [place]  Onset Date: 8/8/2024    Precautions/Fall Risk:weight bearing status: FWB Pacemaker no  Seizures No  Post Op Movement/Restrictions No    Subjective/Pain: Patient reports she feels tired from the Holidays. No real pain just fatigue and soreness.   Current Pain Level (0-10): 2/10    HEP Compliance: Excellent    Objective/Outcome Measures:  R knee flexion WNL  R knee isometric  testing: avg 69.5#  L knee isometric leg ext: 90#  Palpable swelling popliteal troy on R knee      Treatment  Therapeutic Exercise 45 minutes - 3 units  Walk 3 minutes  jog to walk 1:1 total 10 minutes   Lunge Push Back: 2 x 10  Squat to Pivot with Med ball: 2 x 10    TG SL squat Jump: Lvl 7 eccentrics: 1 cord 1st round: 20 reps, 2nd round: 15 reps, 3rd round: 15 reps  Elevated Lunges (F/B) x 10 R/L  TRX pistol squat to chair x 15   Double Leg Hop (vertical/lateral): 20x ea   SL to DL bounding from 4\" " "block x 10  DL to SL bounding from 4\" x 10  DL bounding to vertical Hop: 4\" x 10     Plyo: (not today)  -Double leg hops: x 20 @ TRX  -SL lunge vertical hops: x 20 @ TRX   - SL bounding to  alternating SL forward bounding x 10 R/L  -Box Jump to vertical hop x 10   Sliding Lateral Lunge: x 15 R/L   - SL forward hop // Diagonals: 4\"x 20 ea   --SL hop to DL land: 2 x 10   Walking Lunges: 6# 10' length x 3     Manual Therapy   minutes -Manual  -Not today  HS stretch and R knee extension Long sit with Manual assisted strap calf stretch on 1/2 foam roll      Modalities -  Vasopneumatic Device       minutes -  34 deg mod compression with leg elevated for post treatment swelling     Goals:  Lower Extremity Goals  Lower Extremity Goals: By discharge, patient will:  1) Demonstrate independence with home exercise program for overall symptom management-MET  2) Increase overall exercise tolerance without adverse reaction or increased chief complaint-Progressing  3) Increase ROM of the R knee to 90 degrees (within 2-4 weeks) and WNL within 6-8 weeks in order to improve the ability to perform essential ADLs-MET  4) Increase strength of the R LE to 4/5 (within 6 weeks) and 5/5 within 12 weeks in order to improve the ability to perform essential ADLs-MET  5) Report decrease in pain by >= 2 points to meet MCID -MET  6) Increase score of LEFS by > 9 points to meet the MCID  7) Ascend and descend 1 flight of stairs reciprocally and safely without an increase in symptoms-Progressing   8) Ambulate x WBAT with assistive device without an increase in symptoms at 6 weeks.-MET  9) Ambulate x community distances without assistive device and without compensation or pain within 8-10 weeks.-MET  10) Be able to perform the ADL of squatting to floor to  object without an increase or production of symptoms-Progressing    "

## 2025-01-28 NOTE — PROGRESS NOTES
History of Present Illness:  Date of Surgery: 24 right knee ACL reconstruction, BTB autograft, and meniscus repair. Patient had high-grade a vertical tear of the posterior horn the medial meniscus over 1.5 cm area. Patient had a notable complete rupture in the ACL.  Overall states she is doing very well the knee is continuing to improve.  She is still going to physical therapy and working out on her own.    Imaging:  X-rays right knee: Show well healed ACL reconstruction.     Exam:  Mild effusion  Incisions healed  Full passive motion  Good range of motion  No calf tenderness   Negative Ty's test  Distal neurovascular exam intact    Assessment:  Right knee ACL reconstruction and medial meniscus repair    Plan:  Continue to advance with therapy.   Follow-up as needed    Scribe Attestation  By signing my name below, IEle, Scribe   attest that this documentation has been prepared under the direction and in the presence of Kelton Kothari MD.     Past Medical History:   Diagnosis Date    ADD (attention deficit disorder)     Personal history of other specified conditions 2019    History of syncope    Seasonal allergies     Wears glasses        Medication Documentation Review Audit       Reviewed by Alissa Hidalgo MA (Medical Assistant) on 08/15/24 at 1431      Medication Order Taking? Sig Documenting Provider Last Dose Status   amphetamine-dextroamphetamine (Adderall) 20 mg tablet [id number] No Take 2 tablets (40 mg) by mouth once daily. MR one tablet @ 1 PM if needed ZAHRA Rader 2024 Active   azelastine (Astelin) 137 mcg (0.1 %) nasal spray [id number] No Administer 1 spray into each nostril 2 times a day for 10 days. Use in each nostril as directed ZAHRA Rader Taking  24 2359   drospirenone-ethinyl estradioL (Flaca, Ocella) 3-0.03 mg tablet [id number] No Take 1 tablet by mouth once daily. KIRSTIE Gaxiola 2024 Active   ibuprofen 800  mg tablet [id number] No Take 200 mg by mouth every 8 hours if needed. Historical Provider, MD Not Taking Active   levocetirizine (Xyzal) 5 mg tablet [id number] No Take 1 tablet (5 mg) by mouth once daily. Vane Pyleston, DO 2024 2200 Active   oxyCODONE-acetaminophen (Percocet) 5-325 mg tablet [id number]  Take 1 tablet by mouth every 6 hours if needed for severe pain (7 - 10) for up to 7 days. Do not fill before 2024. Perla Wilkinson PA-C   24 [id number]                    Allergies   Allergen Reactions    Cat Dander Itching    House Dust Itching       Social History     Socioeconomic History    Marital status: Single     Spouse name: Not on file    Number of children: Not on file    Years of education: Not on file    Highest education level: Not on file   Occupational History    Not on file   Tobacco Use    Smoking status: Former     Types: Cigarettes    Smokeless tobacco: Never   Substance and Sexual Activity    Alcohol use: Yes     Comment: Social    Drug use: Not Currently     Types: Marijuana     Comment: Edibles mothly per Pt.    Sexual activity: Defer     Comment: LMP   24   Other Topics Concern    Not on file   Social History Narrative    Not on file     Social Drivers of Health     Financial Resource Strain: Not on file   Food Insecurity: Not on file   Transportation Needs: Not on file   Physical Activity: Not on file   Stress: Not on file   Social Connections: Not on file   Intimate Partner Violence: Not on file   Housing Stability: Not on file       Past Surgical History:   Procedure Laterality Date    APPENDECTOMY  2017    Appendectomy

## 2025-01-30 ENCOUNTER — OFFICE VISIT (OUTPATIENT)
Dept: ORTHOPEDIC SURGERY | Facility: CLINIC | Age: 40
End: 2025-01-30
Payer: COMMERCIAL

## 2025-01-30 DIAGNOSIS — S83.511A RUPTURE OF ANTERIOR CRUCIATE LIGAMENT OF RIGHT KNEE, INITIAL ENCOUNTER: Primary | ICD-10-CM

## 2025-01-30 PROCEDURE — 99213 OFFICE O/P EST LOW 20 MIN: CPT | Performed by: ORTHOPAEDIC SURGERY

## 2025-02-03 ENCOUNTER — APPOINTMENT (OUTPATIENT)
Dept: PHYSICAL THERAPY | Facility: CLINIC | Age: 40
End: 2025-02-03
Payer: COMMERCIAL

## 2025-02-03 DIAGNOSIS — R26.2 DIFFICULTY WALKING: ICD-10-CM

## 2025-02-03 DIAGNOSIS — M25.561 ACUTE PAIN OF RIGHT KNEE: ICD-10-CM

## 2025-02-10 ENCOUNTER — TREATMENT (OUTPATIENT)
Dept: PHYSICAL THERAPY | Facility: CLINIC | Age: 40
End: 2025-02-10
Payer: COMMERCIAL

## 2025-02-10 DIAGNOSIS — R26.2 DIFFICULTY WALKING: ICD-10-CM

## 2025-02-10 DIAGNOSIS — M25.561 ACUTE PAIN OF RIGHT KNEE: ICD-10-CM

## 2025-02-10 PROCEDURE — 97110 THERAPEUTIC EXERCISES: CPT | Mod: GP | Performed by: PHYSICAL THERAPIST

## 2025-02-10 NOTE — PROGRESS NOTES
"Treatment note  Physical Therapy Treatment  Patient Name:Rylee Bear  MRN:[id number]  Today's Date:2/10/2025  Referred by: Taye  Time Calculation  Start Time: 0700  Stop Time: 0757  Time Calculation (min): 57 min    Therapy Diagnosis  1. Difficulty walking    2. Acute pain of right knee      Assessment: Patient had follow up with MD/PA on 1/30 without anything new to report. She continues to have noticeable difficulty with lowering tasks such as stairs and tap downs. Educated patient on strengthening protocol. Today's focus on SL balance and strengthening on surgical leg. Patient demonstrating improvements in both balance and overall strength but continues to have some instability during jumping motions.     Plan: Continue to progress with plyometrics, eccentric, and SL activities.  Decrease frequency to 1x/week every other in new year and focus on independent strengthening    Insurance  Visit number:  33 (2 in new year)  Approved number of visits: [place]  Onset Date: 8/8/2024    Precautions/Fall Risk:weight bearing status: FWB Pacemaker no  Seizures No  Post Op Movement/Restrictions No    Subjective/Pain: Patient reports she is doing well. She has no pain and has been working on strength independently at the gym. Continues to report difficulty with SL and eccentric lowering tasks.  Current Pain Level (0-10): 0/10    HEP Compliance: Excellent    Objective/Outcome Measures:  R knee flexion WNL  R knee isometric  testing: avg 69.5#  L knee isometric leg ext: 90#  Palpable swelling popliteal troy on R knee    Treatment  Therapeutic Exercise 57 minutes - 4 units  Walk 8 minutes  Lunge Push Back: 2 x 10 R/L  Reverse Lunge (elevated)  4\" x 20 R/L  Squat to Pivot with Med ball: 2 x 10    TG SL Lvl 5 2 cords: 1st 15; 2nd: 13; 3rd: 12  TG DL Jump/SL Land: 3 x 12  SL DL 2 x 10 10#   DL hop to SL bounding onto 4\" block x 15  DL to SL bounding from 4\" x 10  DL bounding to vertical Hop: 4\" x 10   Side Step banded walk (band at " "feet/thighs) 3x length of mirror)    Plyo: (not today)  -Double leg hops: x 20 @ TRX  -SL lunge vertical hops: x 20 @ TRX   - SL bounding to  alternating SL forward bounding x 10 R/L  -Box Jump to vertical hop x 10   Sliding Lateral Lunge: x 15 R/L   - SL forward hop // Diagonals: 4\"x 20 ea   --SL hop to DL land: 2 x 10   Walking Lunges: 6# 10' length x 3     Manual Therapy   minutes -Manual  -Not today    Modalities -  Vasopneumatic Device       minutes -  34 deg mod compression with leg elevated for post treatment swelling     Goals:  Lower Extremity Goals  Lower Extremity Goals: By discharge, patient will:  1) Demonstrate independence with home exercise program for overall symptom management-MET  2) Increase overall exercise tolerance without adverse reaction or increased chief complaint-Progressing  3) Increase ROM of the R knee to 90 degrees (within 2-4 weeks) and WNL within 6-8 weeks in order to improve the ability to perform essential ADLs-MET  4) Increase strength of the R LE to 4/5 (within 6 weeks) and 5/5 within 12 weeks in order to improve the ability to perform essential ADLs-MET  5) Report decrease in pain by >= 2 points to meet MCID -MET  6) Increase score of LEFS by > 9 points to meet the MCID  7) Ascend and descend 1 flight of stairs reciprocally and safely without an increase in symptoms-Progressing   8) Ambulate x WBAT with assistive device without an increase in symptoms at 6 weeks.-MET  9) Ambulate x community distances without assistive device and without compensation or pain within 8-10 weeks.-MET  10) Be able to perform the ADL of squatting to floor to  object without an increase or production of symptoms-Progressing    "

## 2025-02-24 ENCOUNTER — TREATMENT (OUTPATIENT)
Dept: PHYSICAL THERAPY | Facility: CLINIC | Age: 40
End: 2025-02-24
Payer: COMMERCIAL

## 2025-02-24 DIAGNOSIS — R26.2 DIFFICULTY WALKING: ICD-10-CM

## 2025-02-24 DIAGNOSIS — M25.561 ACUTE PAIN OF RIGHT KNEE: ICD-10-CM

## 2025-02-24 PROCEDURE — 97110 THERAPEUTIC EXERCISES: CPT | Mod: GP | Performed by: PHYSICAL THERAPIST

## 2025-02-24 NOTE — PROGRESS NOTES
Treatment note  Physical Therapy Treatment  Patient Name:Rylee Bear  MRN:[id number]  Today's Date:2/24/2025  Referred by: Taye  Time Calculation  Start Time: 0657  Stop Time: 0743  Time Calculation (min): 46 min    Therapy Diagnosis  1. Difficulty walking    2. Acute pain of right knee      Assessment: Patient has continued difficulty with SL plyometrics today and with increased fatigued. She requires some assistance/compensation throughout session due to fatigue. No pain throughout session but continued feeling of instability during single leg activity. Patient interested in running analysis but encouraged patient to be at least 70% of uninvolved quad strength prior to assessment.     Plan: Re-assess isometric quad strength next visit  Continue to progress with plyometrics, eccentric, and SL activities.  Decrease frequency to 1x/week every other in new year and focus on independent strengthening    Insurance  Visit number:  34 (3 in new year)  Approved number of visits: MN  Onset Date: 8/8/2024    Precautions/Fall Risk:weight bearing status: FWB Pacemaker no  Seizures No  Post Op Movement/Restrictions No    Subjective/Pain: Patient reports she was pretty swollen for a few days following last session and took a while for it to go down. She reports some stiffness along the lateral posterior aspect of the knee. Denies pain this date  Current Pain Level (0-10): 0/10    HEP Compliance: Excellent    Objective/Outcome Measures:  R knee flexion WNL  R knee isometric  testing: avg 69.5#  L knee isometric leg ext: 90#  Palpable swelling popliteal troy on R knee    Treatment  Therapeutic Exercise 46 minutes -3 units  Walk 5 minutes  BFRT SL Squat on TG Lvl 3 (no resistance)  F/L lateral Lunge Push Back: 2 x 10 R/L   SL DL to knee drive 2 x 10 10# R/L  DL hop (lateral/Forward) x 20 (over body blade): x 20   SL Squat TRX 3 x 10 to chair  TRX DL/SL jump: 2 x 10    Plyo: (not today)  -Double leg hops: x 20 @ TRX  -SL lunge  "vertical hops: x 20 @ TRX   - SL bounding to  alternating SL forward bounding x 10 R/L  -Box Jump to vertical hop x 10   Sliding Lateral Lunge: x 15 R/L   - SL forward hop // Diagonals: 4\"x 20 ea   --SL hop to DL land: 2 x 10   Walking Lunges: 6# 10' length x 3     Manual Therapy   minutes -Manual  -Not today    Modalities -  Vasopneumatic Device       minutes -  34 deg mod compression with leg elevated for post treatment swelling     Goals:  Lower Extremity Goals  Lower Extremity Goals: By discharge, patient will:  1) Demonstrate independence with home exercise program for overall symptom management-MET  2) Increase overall exercise tolerance without adverse reaction or increased chief complaint-Progressing  3) Increase ROM of the R knee to 90 degrees (within 2-4 weeks) and WNL within 6-8 weeks in order to improve the ability to perform essential ADLs-MET  4) Increase strength of the R LE to 4/5 (within 6 weeks) and 5/5 within 12 weeks in order to improve the ability to perform essential ADLs-MET  5) Report decrease in pain by >= 2 points to meet MCID -MET  6) Increase score of LEFS by > 9 points to meet the MCID  7) Ascend and descend 1 flight of stairs reciprocally and safely without an increase in symptoms-Progressing   8) Ambulate x WBAT with assistive device without an increase in symptoms at 6 weeks.-MET  9) Ambulate x community distances without assistive device and without compensation or pain within 8-10 weeks.-MET  10) Be able to perform the ADL of squatting to floor to  object without an increase or production of symptoms-Progressing    "

## 2025-03-10 ENCOUNTER — TREATMENT (OUTPATIENT)
Dept: PHYSICAL THERAPY | Facility: CLINIC | Age: 40
End: 2025-03-10
Payer: COMMERCIAL

## 2025-03-10 DIAGNOSIS — R26.2 DIFFICULTY WALKING: ICD-10-CM

## 2025-03-10 DIAGNOSIS — M25.561 ACUTE PAIN OF RIGHT KNEE: ICD-10-CM

## 2025-03-10 PROCEDURE — 97110 THERAPEUTIC EXERCISES: CPT | Mod: GP | Performed by: PHYSICAL THERAPIST

## 2025-03-10 NOTE — PROGRESS NOTES
"Treatment note  Physical Therapy Treatment  Patient Name:Rylee Bear  MRN:[id number]  Today's Date:3/10/2025  Referred by: Taye  Time Calculation  Start Time: 0701  Stop Time: 0750  Time Calculation (min): 49 min    Therapy Diagnosis  1. Difficulty walking    2. Acute pain of right knee      Assessment: Patient demonstrates improving R knee/LE strength and control during eccentrics this date. She has not yet progressed to running and still has noted deficits from R to L side with SL jumping and plyometric activities. No pain noted throughout session.     Plan: Re-assess isometric quad strength next visit  Continue to progress with plyometrics, eccentric, and SL activities.  Decrease frequency to 1x/week every other in new year and focus on independent strengthening    Insurance  Visit number:  35 (4 in new year)  Approved number of visits: MN  Onset Date: 8/8/2024    Precautions/Fall Risk:weight bearing status: FWB Pacemaker no  Seizures No  Post Op Movement/Restrictions No    Subjective/Pain: Patient reports her knee has felt fine. No swelling or pain. She reports she has been walking a lot and working on jumping activities at home.   Current Pain Level (0-10): 0/10    HEP Compliance: Excellent    Objective/Outcome Measures:  R knee flexion WNL  R knee isometric  testing: avg 69.5#  L knee isometric leg ext: 90#  Palpable swelling popliteal troy on R knee    Treatment  Therapeutic Exercise 49 minutes -3 units  Walk 5 minutes 5% incline  TG 4 cords Lvl 7 3 x10   TG 2 cords Lvl 7 2 x 10 SL   Split squat to reverse lunge: 2\" step x 15 R/L  Squat walk with Med Ball: 7# x 3 distances 10 feet  TRX DL/SL jump: 2 x 10  SL Squat TRX 3 x 10 to chair    Plyo: (not today)  -Double leg hops: x 20 @ TRX  -SL lunge vertical hops: x 20 @ TRX   - SL bounding to  alternating SL forward bounding x 10 R/L  -Box Jump to vertical hop x 10   Sliding Lateral Lunge: x 15 R/L   - SL forward hop // Diagonals: 4\"x 20 ea   --SL hop to DL " land: 2 x 10   Walking Lunges: 6# 10' length x 3     Manual Therapy   minutes -Manual  -Not today    Modalities -  Vasopneumatic Device       minutes -  34 deg mod compression with leg elevated for post treatment swelling     Goals:  Lower Extremity Goals  Lower Extremity Goals: By discharge, patient will:  1) Demonstrate independence with home exercise program for overall symptom management-MET  2) Increase overall exercise tolerance without adverse reaction or increased chief complaint-Progressing  3) Increase ROM of the R knee to 90 degrees (within 2-4 weeks) and WNL within 6-8 weeks in order to improve the ability to perform essential ADLs-MET  4) Increase strength of the R LE to 4/5 (within 6 weeks) and 5/5 within 12 weeks in order to improve the ability to perform essential ADLs-MET  5) Report decrease in pain by >= 2 points to meet MCID -MET  6) Increase score of LEFS by > 9 points to meet the MCID  7) Ascend and descend 1 flight of stairs reciprocally and safely without an increase in symptoms-Progressing   8) Ambulate x WBAT with assistive device without an increase in symptoms at 6 weeks.-MET  9) Ambulate x community distances without assistive device and without compensation or pain within 8-10 weeks.-MET  10) Be able to perform the ADL of squatting to floor to  object without an increase or production of symptoms-Progressing

## 2025-03-24 ENCOUNTER — TREATMENT (OUTPATIENT)
Dept: PHYSICAL THERAPY | Facility: CLINIC | Age: 40
End: 2025-03-24
Payer: COMMERCIAL

## 2025-03-24 DIAGNOSIS — R26.2 DIFFICULTY WALKING: ICD-10-CM

## 2025-03-24 DIAGNOSIS — M25.561 ACUTE PAIN OF RIGHT KNEE: ICD-10-CM

## 2025-03-24 PROCEDURE — 97110 THERAPEUTIC EXERCISES: CPT | Mod: GP | Performed by: PHYSICAL THERAPIST

## 2025-03-24 PROCEDURE — 97140 MANUAL THERAPY 1/> REGIONS: CPT | Mod: GP | Performed by: PHYSICAL THERAPIST

## 2025-03-24 NOTE — PROGRESS NOTES
"Treatment note  Physical Therapy Treatment  Patient Name:Rylee Bear  MRN:[id number]  Today's Date:3/24/2025  Referred by: Taye  Time Calculation  Start Time: 0700  Stop Time: 0750  Time Calculation (min): 50 min    Therapy Diagnosis  1. Difficulty walking    2. Acute pain of right knee      Assessment: Focus today on soft tissue mobility. Patient already exercised this morning and feels some manual techniques and plyometric work would be most beneficial this date.  She presents with good knee ROM and improved extension with less feeling of blocking sensation in her popliteal fossa. Patient demonstrates improving R knee/LE strength and control during eccentrics this date but continues to be her biggest deficit. She has just started jogging/walking and still has noted deficits from R to L side with SL jumping and plyometric activities. No pain noted throughout session. Mild swelling in thigh above patella laterally.     Plan: Re-assess isometric quad strength next visit  Continue to progress with plyometrics, eccentric, and SL activities.  Decrease frequency to 1x/week every other in new year and focus on independent strengthening    Insurance  Visit number:  36 (5 in new year)  Approved number of visits: MN  Onset Date: 8/8/2024    Precautions/Fall Risk:weight bearing status: FWB Pacemaker no  Seizures No  Post Op Movement/Restrictions No    Subjective/Pain: Patient reports her knee has felt fine. No swelling or pain. She reports she has been walking a lot and working on jumping activities at home.   Current Pain Level (0-10): 0/10    HEP Compliance: Excellent    Objective/Outcome Measures:  R knee flexion WNL  R knee isometric  testing: avg 69.5#  L knee isometric leg ext: 90#  Palpable swelling popliteal troy on R knee    Treatment  Therapeutic Exercise 25 minutes -3 units  Prone Quad Stretch: 1' x 3  Long sit Calf stretch with QS: 5\" x 20   SL Squat to chair with airex and small block 2 x 10   SL bounding " "from 2\" box: x 20   DL hop + Rebound Hop: x 5   SL Step up 4\" (no contralateral leg assist)  Education on continuing HEP with plyo/strength prescription       Plyo: (not today)  -Double leg hops: x 20 @ TRX  -SL lunge vertical hops: x 20 @ TRX   - SL bounding to  alternating SL forward bounding x 10 R/L  -Box Jump to vertical hop x 10   Sliding Lateral Lunge: x 15 R/L   - SL forward hop // Diagonals: 4\"x 20 ea   --SL hop to DL land: 2 x 10   Walking Lunges: 6# 10' length x 3     Manual Therapy 25 minutes -Manual  - Graston Technique to R quad, HS, gastrocs     Modalities -  Vasopneumatic Device       minutes -  34 deg mod compression with leg elevated for post treatment swelling     Goals:  Lower Extremity Goals  Lower Extremity Goals: By discharge, patient will:  1) Demonstrate independence with home exercise program for overall symptom management-MET  2) Increase overall exercise tolerance without adverse reaction or increased chief complaint-Progressing  3) Increase ROM of the R knee to 90 degrees (within 2-4 weeks) and WNL within 6-8 weeks in order to improve the ability to perform essential ADLs-MET  4) Increase strength of the R LE to 4/5 (within 6 weeks) and 5/5 within 12 weeks in order to improve the ability to perform essential ADLs-MET  5) Report decrease in pain by >= 2 points to meet MCID -MET  6) Increase score of LEFS by > 9 points to meet the MCID  7) Ascend and descend 1 flight of stairs reciprocally and safely without an increase in symptoms-Progressing   8) Ambulate x WBAT with assistive device without an increase in symptoms at 6 weeks.-MET  9) Ambulate x community distances without assistive device and without compensation or pain within 8-10 weeks.-MET  10) Be able to perform the ADL of squatting to floor to  object without an increase or production of symptoms-Progressing    "

## 2025-03-31 ENCOUNTER — APPOINTMENT (OUTPATIENT)
Dept: PHYSICAL THERAPY | Facility: CLINIC | Age: 40
End: 2025-03-31
Payer: COMMERCIAL

## 2025-03-31 DIAGNOSIS — R26.2 DIFFICULTY WALKING: ICD-10-CM

## 2025-03-31 DIAGNOSIS — M25.561 ACUTE PAIN OF RIGHT KNEE: ICD-10-CM

## 2025-04-14 ENCOUNTER — TREATMENT (OUTPATIENT)
Dept: PHYSICAL THERAPY | Facility: CLINIC | Age: 40
End: 2025-04-14
Payer: COMMERCIAL

## 2025-04-14 DIAGNOSIS — R26.2 DIFFICULTY WALKING: ICD-10-CM

## 2025-04-14 DIAGNOSIS — M25.561 ACUTE PAIN OF RIGHT KNEE: ICD-10-CM

## 2025-04-14 PROCEDURE — 97110 THERAPEUTIC EXERCISES: CPT | Mod: GP | Performed by: PHYSICAL THERAPIST

## 2025-04-14 NOTE — PROGRESS NOTES
"Treatment note  Physical Therapy Treatment  Patient Name:Rylee Bear  MRN:[id number]  Today's Date:4/14/2025  Referred by: Taye  Time Calculation  Start Time: 0700  Stop Time: 0733  Time Calculation (min): 33 min    Therapy Diagnosis  1. Difficulty walking    2. Acute pain of right knee      Assessment: Patient has progressed toward meeting therapy goals this date.  She continues to work on independent strengthening and running per protocol and for half marathon training. Her biggest limitation is plyometric activities, specifically single leg focus on R LE. Re-assessed isometric quad strength today which has improved since last measurement by roughly 6 # but is still less than L LE by 15 lbs.   Her R knee ROM is WNL and no swelling present in knee or popliteal fossa this date.     Plan: Discharge next visit    Insurance  Visit number:  37 (5 in new year)  Approved number of visits: MN  Onset Date: 8/8/2024    Precautions/Fall Risk:weight bearing status: FWB Pacemaker no  Seizures No  Post Op Movement/Restrictions No    Subjective/Pain: Patient reports her L side feels a little sore from running over the weekend but I have no pain.   HEP Compliance: Excellent    Objective/Outcome Measures:  R knee flexion WNL  R knee isometric  testing: avg 75#  L knee isometric leg ext: 90#    Treatment  Therapeutic Exercise 33 minutes -2 units  Prone Quad Stretch: 1' x 3  Long sit Calf stretch with QS: 5\" x 20   Manual HS stretch  TG DL Lvl 7 4 cords 3 x 10   TG SL Lvl7 2 cords 2 x 10   Isometric quad strength testing x 3 R/L    Plyo: (not today)  -Double leg hops: x 20 @ TRX  -SL lunge vertical hops: x 20 @ TRX   - SL bounding to  alternating SL forward bounding x 10 R/L  -Box Jump to vertical hop x 10   Sliding Lateral Lunge: x 15 R/L   - SL forward hop // Diagonals: 4\"x 20 ea   --SL hop to DL land: 2 x 10   Walking Lunges: 6# 10' length x 3     Manual Therapy   minutes -Manual  -     Modalities -  Vasopneumatic Device      "  minutes     Goals:  Lower Extremity Goals  Lower Extremity Goals: By discharge, patient will:  1) Demonstrate independence with home exercise program for overall symptom management-MET  2) Increase overall exercise tolerance without adverse reaction or increased chief complaint-MET  3) Increase ROM of the R knee to 90 degrees (within 2-4 weeks) and WNL within 6-8 weeks in order to improve the ability to perform essential ADLs-MET  4) Increase strength of the R LE to 4/5 (within 6 weeks) and 5/5 within 12 weeks in order to improve the ability to perform essential ADLs-MET  5) Report decrease in pain by >= 2 points to meet MCID -MET  6) Increase score of LEFS by > 9 points to meet the MCID  7) Ascend and descend 1 flight of stairs reciprocally and safely without an increase in symptoms-Progressing / Mostly Met  8) Ambulate x WBAT with assistive device without an increase in symptoms at 6 weeks.-MET  9) Ambulate x community distances without assistive device and without compensation or pain within 8-10 weeks.-MET  10) Be able to perform the ADL of squatting to floor to  object without an increase or production of symptoms-MET

## 2025-04-28 ENCOUNTER — APPOINTMENT (OUTPATIENT)
Dept: PHYSICAL THERAPY | Facility: CLINIC | Age: 40
End: 2025-04-28
Payer: COMMERCIAL

## 2025-04-28 DIAGNOSIS — R26.2 DIFFICULTY WALKING: ICD-10-CM

## 2025-04-28 DIAGNOSIS — M25.561 ACUTE PAIN OF RIGHT KNEE: ICD-10-CM

## 2025-04-30 ENCOUNTER — APPOINTMENT (OUTPATIENT)
Dept: ALLERGY | Facility: CLINIC | Age: 40
End: 2025-04-30
Payer: COMMERCIAL

## 2025-04-30 VITALS
WEIGHT: 179 LBS | SYSTOLIC BLOOD PRESSURE: 118 MMHG | TEMPERATURE: 99 F | HEART RATE: 74 BPM | DIASTOLIC BLOOD PRESSURE: 76 MMHG | RESPIRATION RATE: 17 BRPM | OXYGEN SATURATION: 98 % | HEIGHT: 69 IN | BODY MASS INDEX: 26.51 KG/M2

## 2025-04-30 DIAGNOSIS — J30.9 ALLERGIC RHINITIS, UNSPECIFIED SEASONALITY, UNSPECIFIED TRIGGER: ICD-10-CM

## 2025-04-30 DIAGNOSIS — J06.9 VIRAL UPPER RESPIRATORY TRACT INFECTION: ICD-10-CM

## 2025-04-30 PROCEDURE — 99213 OFFICE O/P EST LOW 20 MIN: CPT | Performed by: ALLERGY & IMMUNOLOGY

## 2025-04-30 PROCEDURE — 3008F BODY MASS INDEX DOCD: CPT | Performed by: ALLERGY & IMMUNOLOGY

## 2025-04-30 RX ORDER — AZELASTINE 1 MG/ML
1 SPRAY, METERED NASAL 2 TIMES DAILY
Qty: 30 ML | Refills: 11 | Status: SHIPPED | OUTPATIENT
Start: 2025-04-30 | End: 2025-05-10

## 2025-04-30 RX ORDER — LEVOCETIRIZINE DIHYDROCHLORIDE 5 MG/1
5 TABLET, FILM COATED ORAL DAILY
Qty: 90 TABLET | Refills: 3 | Status: SHIPPED | OUTPATIENT
Start: 2025-04-30 | End: 2026-04-30

## 2025-04-30 ASSESSMENT — ENCOUNTER SYMPTOMS
HEMATOLOGIC/LYMPHATIC NEGATIVE: 1
MUSCULOSKELETAL NEGATIVE: 1
CONSTITUTIONAL NEGATIVE: 1
PSYCHIATRIC NEGATIVE: 1
GASTROINTESTINAL NEGATIVE: 1
ENDOCRINE NEGATIVE: 1
NEUROLOGICAL NEGATIVE: 1
RESPIRATORY NEGATIVE: 1
CARDIOVASCULAR NEGATIVE: 1
EYES NEGATIVE: 1

## 2025-04-30 NOTE — PROGRESS NOTES
"Subjective   Patient ID: Rylee Bear is a 40 y.o. female.  Last seen January 2024  Chief Complaint: follow up  39 yo with a history of cat allergy and food intolerance to dairy.  Has two older cats (17 and 19 yo)  Notices congestion with dairy and beer.  No other new issues.      Knee surgery for torn ACL, now ready to do half marathon.    Environmental History: Pets in the home: cats (2).    Review of Systems   Constitutional: Negative.    HENT: Negative.     Eyes: Negative.    Respiratory: Negative.     Cardiovascular: Negative.    Gastrointestinal: Negative.    Endocrine: Negative.    Genitourinary: Negative.    Musculoskeletal: Negative.    Neurological: Negative.    Hematological: Negative.    Psychiatric/Behavioral: Negative.       /76   Pulse 74   Temp 37.2 °C (99 °F) (Temporal)   Resp 17   Ht 1.753 m (5' 9\")   Wt 81.2 kg (179 lb)   SpO2 98%   BMI 26.43 kg/m²     Objective   Physical Exam  Vitals and nursing note reviewed.   Constitutional:       Appearance: Normal appearance.   HENT:      Right Ear: Tympanic membrane normal.      Left Ear: Tympanic membrane normal.      Nose: Nose normal.      Mouth/Throat:      Mouth: Mucous membranes are moist.   Eyes:      Conjunctiva/sclera: Conjunctivae normal.      Pupils: Pupils are equal, round, and reactive to light.   Cardiovascular:      Rate and Rhythm: Normal rate and regular rhythm.      Heart sounds: Normal heart sounds.   Pulmonary:      Effort: Pulmonary effort is normal.      Breath sounds: Normal breath sounds.   Abdominal:      General: Bowel sounds are normal.      Palpations: Abdomen is soft.   Musculoskeletal:         General: Normal range of motion.   Skin:     General: Skin is warm and dry.      Capillary Refill: Capillary refill takes less than 2 seconds.   Neurological:      General: No focal deficit present.      Mental Status: She is alert and oriented to person, place, and time.   Psychiatric:         Mood and Affect: Mood " normal.       Laboratory:   N/a    Assessment/Plan    Well controlled allergic rhinitis on Xyzal only with as needed Astelin. Has two elderly cats.  Declines additional testing  Continue current medication and avoidance measures  Follow up yearly or as needed.

## 2025-06-24 ENCOUNTER — TELEPHONE (OUTPATIENT)
Dept: PRIMARY CARE | Facility: CLINIC | Age: 40
End: 2025-06-24
Payer: COMMERCIAL

## 2025-06-24 DIAGNOSIS — F98.8 ATTENTION DEFICIT DISORDER (ADD) IN ADULT: ICD-10-CM

## 2025-06-24 RX ORDER — DEXTROAMPHETAMINE SACCHARATE, AMPHETAMINE ASPARTATE, DEXTROAMPHETAMINE SULFATE AND AMPHETAMINE SULFATE 5; 5; 5; 5 MG/1; MG/1; MG/1; MG/1
40 TABLET ORAL DAILY
Qty: 90 TABLET | Refills: 0 | Status: SHIPPED | OUTPATIENT
Start: 2025-06-24 | End: 2025-07-24

## 2025-06-24 NOTE — TELEPHONE ENCOUNTER
Eriberto patient.  Establishing care with [facility] in     Refill request for the following to make it to her appointment    amphetamine-dextroamphetamine (Adderall) 20 mg tablet ()      Please send to [facility] in [facility]

## 2025-06-25 RX ORDER — DEXTROAMPHETAMINE SACCHARATE, AMPHETAMINE ASPARTATE, DEXTROAMPHETAMINE SULFATE AND AMPHETAMINE SULFATE 5; 5; 5; 5 MG/1; MG/1; MG/1; MG/1
40 TABLET ORAL DAILY
Qty: 90 TABLET | Refills: 0 | OUTPATIENT
Start: 2025-06-25 | End: 2025-08-09

## 2025-07-08 DIAGNOSIS — F98.8 ATTENTION DEFICIT DISORDER (ADD) IN ADULT: ICD-10-CM

## 2025-07-08 RX ORDER — DEXTROAMPHETAMINE SACCHARATE, AMPHETAMINE ASPARTATE, DEXTROAMPHETAMINE SULFATE AND AMPHETAMINE SULFATE 5; 5; 5; 5 MG/1; MG/1; MG/1; MG/1
40 TABLET ORAL DAILY
Qty: 90 TABLET | Refills: 0 | Status: SHIPPED | OUTPATIENT
Start: 2025-07-08 | End: 2025-08-07

## 2025-07-24 ENCOUNTER — APPOINTMENT (OUTPATIENT)
Dept: PRIMARY CARE | Facility: CLINIC | Age: 40
End: 2025-07-24
Payer: COMMERCIAL

## 2025-07-24 VITALS
SYSTOLIC BLOOD PRESSURE: 124 MMHG | DIASTOLIC BLOOD PRESSURE: 78 MMHG | HEART RATE: 91 BPM | RESPIRATION RATE: 20 BRPM | OXYGEN SATURATION: 97 % | WEIGHT: 191 LBS | BODY MASS INDEX: 28.21 KG/M2

## 2025-07-24 DIAGNOSIS — R79.89 ABNORMAL TSH: ICD-10-CM

## 2025-07-24 DIAGNOSIS — F98.8 ATTENTION DEFICIT DISORDER (ADD) IN ADULT: ICD-10-CM

## 2025-07-24 DIAGNOSIS — Z12.31 ENCOUNTER FOR SCREENING MAMMOGRAM FOR MALIGNANT NEOPLASM OF BREAST: ICD-10-CM

## 2025-07-24 DIAGNOSIS — E55.9 VITAMIN D DEFICIENCY: ICD-10-CM

## 2025-07-24 DIAGNOSIS — Z00.00 ANNUAL PHYSICAL EXAM: Primary | ICD-10-CM

## 2025-07-24 DIAGNOSIS — R53.83 OTHER FATIGUE: ICD-10-CM

## 2025-07-24 DIAGNOSIS — Z13.220 LIPID SCREENING: ICD-10-CM

## 2025-07-24 DIAGNOSIS — Z30.09 BIRTH CONTROL COUNSELING: ICD-10-CM

## 2025-07-24 PROCEDURE — 99396 PREV VISIT EST AGE 40-64: CPT | Performed by: NURSE PRACTITIONER

## 2025-07-24 ASSESSMENT — PATIENT HEALTH QUESTIONNAIRE - PHQ9
2. FEELING DOWN, DEPRESSED OR HOPELESS: NOT AT ALL
1. LITTLE INTEREST OR PLEASURE IN DOING THINGS: NOT AT ALL
SUM OF ALL RESPONSES TO PHQ9 QUESTIONS 1 AND 2: 0

## 2025-07-24 ASSESSMENT — ENCOUNTER SYMPTOMS
LOSS OF SENSATION IN FEET: 0
OCCASIONAL FEELINGS OF UNSTEADINESS: 0
DEPRESSION: 0

## 2025-07-24 NOTE — PROGRESS NOTES
Subjective   Rylee Bear is a 40 y.o. female and is here for a comprehensive physical exam. The patient reports problems - ADD.Establish Care (Previous provider was through [facility] has retired. Just would like to reestablish as primary and to ask about contraceptive. Also med discussion.. )    Pap 2024    Patient stopped her birth control and she's been off of it and has had more cramping bc of endometriosis.     Do you take any herbs or supplements that were not prescribed by a doctor? no  Are you taking calcium supplements? no  Are you taking aspirin daily? no      History:  LMP: Patient's last menstrual period was 2025 (approximate).  Last pap date: 24  Abnormal pap? no  : 1  Para: 1    Review of Systems   Constitutional:  Negative for chills, fatigue and fever.   HENT:  Negative for rhinorrhea and sore throat.    Respiratory:  Negative for shortness of breath.    Cardiovascular:  Negative for chest pain.   Gastrointestinal:  Negative for constipation and diarrhea.   Genitourinary:  Negative for dysuria.   Neurological:  Negative for headaches.   Psychiatric/Behavioral:  The patient is not nervous/anxious.         Objective   Physical Exam  Constitutional:       General: She is not in acute distress.     Appearance: She is not ill-appearing.   HENT:      Head: Normocephalic and atraumatic.      Right Ear: Tympanic membrane, ear canal and external ear normal.      Left Ear: Tympanic membrane, ear canal and external ear normal.      Mouth/Throat:      Mouth: Mucous membranes are moist.      Pharynx: Oropharynx is clear.     Eyes:      Conjunctiva/sclera: Conjunctivae normal.      Pupils: Pupils are equal, round, and reactive to light.       Cardiovascular:      Rate and Rhythm: Normal rate and regular rhythm.      Pulses: Normal pulses.      Heart sounds: Normal heart sounds.   Pulmonary:      Effort: Pulmonary effort is normal. No respiratory distress.      Breath sounds: Normal  breath sounds.   Abdominal:      General: Bowel sounds are normal.      Palpations: Abdomen is soft.      Tenderness: There is no abdominal tenderness.     Musculoskeletal:         General: Normal range of motion.     Skin:     General: Skin is warm and dry.     Neurological:      General: No focal deficit present.      Mental Status: She is alert and oriented to person, place, and time.     Psychiatric:         Mood and Affect: Mood normal.         Behavior: Behavior normal.         Thought Content: Thought content normal.         Judgment: Judgment normal.         Assessment/Plan   Healthy female exam.     1. Patient Counseling:  --Nutrition: Stressed importance of moderation in sodium/caffeine intake, saturated fat and cholesterol, caloric balance, sufficient intake of fresh fruits, vegetables, fiber, calcium, iron, and 1 mg of folate supplement per day (for females capable of pregnancy).  --Discussed the issue of estrogen replacement, calcium supplement, and the daily use of baby aspirin.  --Exercise: Stressed the importance of regular exercise.   --Substance Abuse: Discussed cessation/primary prevention of tobacco, alcohol, or other drug use; driving or other dangerous activities under the influence; availability of treatment for abuse.    --Sexuality: Discussed sexually transmitted diseases, partner selection, use of condoms, avoidance of unintended pregnancy  and contraceptive alternatives.   --Injury prevention: Discussed safety belts, safety helmets, smoke detector, smoking near bedding or upholstery.   --Dental health: Discussed importance of regular tooth brushing, flossing, and dental visits.  --Immunizations reviewed.  2. Follow-up in 3-6 months, or sooner if needed.

## 2025-07-28 ASSESSMENT — ENCOUNTER SYMPTOMS
DIARRHEA: 0
NERVOUS/ANXIOUS: 0
DYSURIA: 0
FEVER: 0
HEADACHES: 0
FATIGUE: 0
RHINORRHEA: 0
SORE THROAT: 0
CONSTIPATION: 0
SHORTNESS OF BREATH: 0
CHILLS: 0

## 2025-08-25 ENCOUNTER — APPOINTMENT (OUTPATIENT)
Facility: CLINIC | Age: 40
End: 2025-08-25
Payer: COMMERCIAL

## 2025-08-25 VITALS — BODY MASS INDEX: 28.32 KG/M2 | SYSTOLIC BLOOD PRESSURE: 148 MMHG | WEIGHT: 191.8 LBS | DIASTOLIC BLOOD PRESSURE: 76 MMHG

## 2025-08-25 DIAGNOSIS — N84.1 CERVICAL POLYP: ICD-10-CM

## 2025-08-25 DIAGNOSIS — Z30.09 ENCOUNTER FOR GENERAL COUNSELING AND ADVICE ON CONTRACEPTIVE MANAGEMENT: ICD-10-CM

## 2025-08-25 DIAGNOSIS — Z01.419 WELL WOMAN EXAM WITH ROUTINE GYNECOLOGICAL EXAM: Primary | ICD-10-CM

## 2025-08-25 PROCEDURE — 99396 PREV VISIT EST AGE 40-64: CPT

## 2025-08-25 RX ORDER — DEXTROAMPHETAMINE SACCHARATE, AMPHETAMINE ASPARTATE MONOHYDRATE, DEXTROAMPHETAMINE SULFATE AND AMPHETAMINE SULFATE 5; 5; 5; 5 MG/1; MG/1; MG/1; MG/1
20 CAPSULE, EXTENDED RELEASE ORAL EVERY MORNING
COMMUNITY

## 2025-08-25 ASSESSMENT — SOCIAL DETERMINANTS OF HEALTH (SDOH)
WITHIN THE LAST YEAR, HAVE YOU BEEN HUMILIATED OR EMOTIONALLY ABUSED IN OTHER WAYS BY YOUR PARTNER OR EX-PARTNER?: NO
WITHIN THE LAST YEAR, HAVE YOU BEEN AFRAID OF YOUR PARTNER OR EX-PARTNER?: NO
WITHIN THE LAST YEAR, HAVE TO BEEN RAPED OR FORCED TO HAVE ANY KIND OF SEXUAL ACTIVITY BY YOUR PARTNER OR EX-PARTNER?: NO
WITHIN THE LAST YEAR, HAVE YOU BEEN KICKED, HIT, SLAPPED, OR OTHERWISE PHYSICALLY HURT BY YOUR PARTNER OR EX-PARTNER?: NO

## 2025-08-25 ASSESSMENT — LIFESTYLE VARIABLES
SKIP TO QUESTIONS 9-10: 1
AUDIT-C TOTAL SCORE: 1
HOW OFTEN DO YOU HAVE A DRINK CONTAINING ALCOHOL: MONTHLY OR LESS
HOW MANY STANDARD DRINKS CONTAINING ALCOHOL DO YOU HAVE ON A TYPICAL DAY: 1 OR 2
HOW OFTEN DO YOU HAVE SIX OR MORE DRINKS ON ONE OCCASION: NEVER

## 2025-09-04 ENCOUNTER — APPOINTMENT (OUTPATIENT)
Facility: CLINIC | Age: 40
End: 2025-09-04
Payer: COMMERCIAL

## 2025-09-05 ENCOUNTER — HOSPITAL ENCOUNTER (OUTPATIENT)
Dept: RADIOLOGY | Facility: CLINIC | Age: 40
Discharge: HOME | End: 2025-09-05
Payer: COMMERCIAL

## 2025-09-05 DIAGNOSIS — Z12.31 ENCOUNTER FOR SCREENING MAMMOGRAM FOR MALIGNANT NEOPLASM OF BREAST: ICD-10-CM

## 2025-09-05 PROCEDURE — 77063 BREAST TOMOSYNTHESIS BI: CPT

## 2025-11-03 ENCOUNTER — APPOINTMENT (OUTPATIENT)
Dept: PRIMARY CARE | Facility: CLINIC | Age: 40
End: 2025-11-03
Payer: COMMERCIAL

## (undated) DEVICE — IMMOBILIZER, KNEE, 19IN, ADJUSTABLE FOAM, W/ ELASTIC STRAPS

## (undated) DEVICE — TUBING, PATIENT 8FT STERILE

## (undated) DEVICE — SUTURE, FIBERWIRE 2, T-5 TAPER NEEDLE, 38"

## (undated) DEVICE — DRESSING, GAUZE, SPONGE, 12 PLY, 4 X 4 IN, PLASTIC POUCH, STRL 10PK

## (undated) DEVICE — GOWN, SURGICAL, IMPLT, BACK, XLARGE, XLONG, STERILE

## (undated) DEVICE — DRAPE, SHEET, THREE QUARTER, FAN FOLD, 57 X 77 IN

## (undated) DEVICE — BANDAGE, COFLEX, 4 X 5 YDS, FOAM TAN, STERILE, LF

## (undated) DEVICE — CUFF, TOURNIQUET, DUAL PORT, SNG BLADDER, 30 IN, PLC

## (undated) DEVICE — Device

## (undated) DEVICE — SUTURE, TAPE, 20 IN, 1.3MM, LOOP, W/ STRT NEEDLE

## (undated) DEVICE — NEEDLE, SCORPION, HD

## (undated) DEVICE — MAT, FLOOR, STANDARD FLUID BARRIER, 32X44, GREEN

## (undated) DEVICE — BANDAGE, ESMARK, 6 IN X 9 FT, STERILE

## (undated) DEVICE — STRAP, VELCRO, BODY, 4 X 60IN, NS

## (undated) DEVICE — DRESSING, ABDOMINAL PAD, CURITY, 7.5 X 8 IN

## (undated) DEVICE — SUTURE, STRATAFIX, 3-0, SPIRAL MONOCRYL PLUS, PS, 70CM, UNDYED

## (undated) DEVICE — SOLUTION, TOPICAL, ALCOHOL, ISOPROPYL 70%, 16 OZ

## (undated) DEVICE — DRESSING, GAUZE, PETROLATUM, STRIP, XEROFORM, 1 X 8 IN, STERILE

## (undated) DEVICE — TOWEL PACK, STERILE, 16X24, XRAY DETECTABLE, BLUE, 4/PK

## (undated) DEVICE — SOLUTION, IRRIGATION, USP, SODIUM CHLORIDE 0.9%, 3000 ML

## (undated) DEVICE — PREP, IODOPHOR, W/ALCOHOL, DURAPREP, W/APPLICATOR, 26 CC

## (undated) DEVICE — BLADE, GEN COATED 2.75, LF

## (undated) DEVICE — REAMER, LOW PROFILE, 10 MM

## (undated) DEVICE — HOLSTER, ELECTROSURGERY ACCESSORY, STERILE

## (undated) DEVICE — PIN, DRILL RETROBUTTON W/3MM SPADE TIP

## (undated) DEVICE — MARKER, SKIN, W/ RULER, LF, STERILE

## (undated) DEVICE — MANIFOLD, 4 PORT NEPTUNE STANDARD

## (undated) DEVICE — STRIP, SKIN CLOSURE, STERI STRIP, REINFORCED, 0.5 X 4 IN

## (undated) DEVICE — BLADE, STRYKER, 4.0MM, RESECTOR, F-SERIES

## (undated) DEVICE — STRAP, ARM BOARD, 32 X 1.5

## (undated) DEVICE — GLOVE, SURGICAL, PROTEXIS PI BLUE W/NEUTHERA, 8.0, PF, LF

## (undated) DEVICE — BLADE, ARTHRO W/10MM ACL STOP

## (undated) DEVICE — PADDING, CAST, SPECIALIST, 6 IN X 4 YD, STERILE

## (undated) DEVICE — BANDAGE, ELASTIC, 6 X 10YD, BEIGE, LF

## (undated) DEVICE — FLIPCUTTER III DRILL

## (undated) DEVICE — SUTURE, VICRYL, 1, 27 IN, CP, VIOLET

## (undated) DEVICE — PROBE, SERFAS, 3.5MM, 50 S, ENERGY SUCTION SYSTEM

## (undated) DEVICE — SUTURE, TAPE, 36 IN X 1.3 IN, TAPERED END/ NEEDLE

## (undated) DEVICE — BLADE, STRYKER, 5.5MM RESECTOR, F-SERIES

## (undated) DEVICE — GLOVE, SURGICAL, ORTHO, PROTEXIS, HYDROGEL, 8.0, PF, LATEX